# Patient Record
Sex: FEMALE | Race: BLACK OR AFRICAN AMERICAN | Employment: FULL TIME | URBAN - METROPOLITAN AREA
[De-identification: names, ages, dates, MRNs, and addresses within clinical notes are randomized per-mention and may not be internally consistent; named-entity substitution may affect disease eponyms.]

---

## 2019-02-20 ENCOUNTER — APPOINTMENT (OUTPATIENT)
Dept: GENERAL RADIOLOGY | Facility: HOSPITAL | Age: 39
End: 2019-02-20
Payer: COMMERCIAL

## 2019-02-20 ENCOUNTER — HOSPITAL ENCOUNTER (EMERGENCY)
Facility: HOSPITAL | Age: 39
Discharge: HOME | End: 2019-02-20
Payer: COMMERCIAL

## 2019-02-20 VITALS
HEIGHT: 66 IN | WEIGHT: 290 LBS | BODY MASS INDEX: 46.61 KG/M2 | DIASTOLIC BLOOD PRESSURE: 102 MMHG | SYSTOLIC BLOOD PRESSURE: 135 MMHG | RESPIRATION RATE: 18 BRPM | HEART RATE: 74 BPM | TEMPERATURE: 98 F | OXYGEN SATURATION: 100 %

## 2019-02-20 DIAGNOSIS — S86.911A KNEE STRAIN, RIGHT, INITIAL ENCOUNTER: Primary | ICD-10-CM

## 2019-02-20 PROCEDURE — 99283 EMERGENCY DEPT VISIT LOW MDM: CPT

## 2019-02-20 PROCEDURE — 73560 X-RAY EXAM OF KNEE 1 OR 2: CPT

## 2019-02-20 NOTE — ED PROVIDER NOTES
Patient Seen in: [facility] AND [facility] Emergency Department    History   Patient presents with:  Knee Pain    Stated Complaint: right knee pain, history of knee sx 7 years ago    HPI    The patient is a 51-year-old female who presents with 3 days of atraumat Musculoskeletal:        Right knee: She exhibits swelling (Mild). She exhibits normal range of motion, no effusion, no deformity, no LCL laxity and no MCL laxity. Tenderness found. Medial joint line and lateral joint line tenderness noted.    No calf tend

## 2019-02-20 NOTE — ED INITIAL ASSESSMENT (HPI)
Atraumatic right knee pain since Sunday.  Patient reports meniscus and ACL repair 7 years ago    Ambulatory in triage, but with pain  Taking tylenol /iburprofen

## 2020-04-23 ENCOUNTER — TELEMEDICINE (OUTPATIENT)
Dept: SURGERY | Facility: CLINIC | Age: 40
End: 2020-04-23

## 2020-04-23 VITALS — BODY MASS INDEX: 44.42 KG/M2 | WEIGHT: 283 LBS | HEIGHT: 67 IN

## 2020-04-23 DIAGNOSIS — Z86.2 HISTORY OF ANEMIA: Primary | ICD-10-CM

## 2020-04-23 DIAGNOSIS — E66.01 MORBID OBESITY WITH BMI OF 45.0-49.9, ADULT (HCC): ICD-10-CM

## 2020-04-23 DIAGNOSIS — R63.5 WEIGHT GAIN: ICD-10-CM

## 2020-04-23 DIAGNOSIS — Z51.81 ENCOUNTER FOR THERAPEUTIC DRUG MONITORING: ICD-10-CM

## 2020-04-23 PROCEDURE — 99204 OFFICE O/P NEW MOD 45 MIN: CPT | Performed by: NURSE PRACTITIONER

## 2020-04-23 RX ORDER — TOPIRAMATE 25 MG/1
25 TABLET ORAL DAILY
Qty: 30 TABLET | Refills: 1 | Status: SHIPPED | OUTPATIENT
Start: 2020-04-23

## 2020-04-23 RX ORDER — PHENTERMINE HYDROCHLORIDE 15 MG/1
15 CAPSULE ORAL EVERY MORNING
Qty: 30 CAPSULE | Refills: 1 | Status: SHIPPED | OUTPATIENT
Start: 2020-04-23

## 2020-04-23 NOTE — PROGRESS NOTES
Virtual Video/Telephone Check-In    Timmy Miller verbally consents to a [employer] visit on 04/23/20.     Patient understands and accepts financial responsibility for any deductible, co-insurance and/or co-pays associated with this kg)  01/22/15 : 277 lb (125.6 kg)  12/29/14 : 280 lb (127 kg)  11/25/14 : 285 lb (129.3 kg)  11/18/14 : 285 lb (129.3 kg)       Patient Medications:    Current Outpatient Medications   Medication Sig Dispense Refill   • Hydroxyprogesterone Caproate 250 MG/ Concerns:        Not on file    Social History Narrative      Not on file    Surgical History:    Past Surgical History:   Procedure Laterality Date   [facility]     • OTHER SURGICAL HISTORY      knee surgery       Family History:  History revi days  Musculoskeletal:negative  Neurological: negative  Behavioral/Psych: negative  Endocrine: negative   Regular menses, not currently sexually active    Physical Exam:  General: alert, oriented x 3, cooperative, speaking in full sentences, appears stated beverages per day. No fruit juices or regular soda. 3. Aim for 150 minutes moderate exercise per week. 4. Increase fruit and vegetable servings to 5-6 per day. 5. Improve sleep and stress. Reviewed labs, plan to update at this time.     Discuss

## 2020-04-23 NOTE — PATIENT INSTRUCTIONS
Values: Feeling good, looking good in clothes, Self, Prevent health issues     Bring in plain broth overnight at work. Try to eat breakfast at work before coming home to sleep.   Eggs with veggies + fruit or Oatmeal with flaxseed ground, add in berries, ad day.    Aim for 3-4 servings of healthy fats: olives, fatty fish, olive oil, seeds, nuts, avocado, coconut oil. Attend bariatric seminar. To schedule bariatric seminar:   Call [phone] or   Schedule Online at- [email or web address]

## 2020-09-07 ENCOUNTER — HOSPITAL ENCOUNTER (OUTPATIENT)
Age: 40
Discharge: HOME | End: 2020-09-07
Payer: COMMERCIAL

## 2020-09-07 VITALS
HEART RATE: 83 BPM | WEIGHT: 290 LBS | HEIGHT: 67 IN | DIASTOLIC BLOOD PRESSURE: 87 MMHG | BODY MASS INDEX: 45.52 KG/M2 | OXYGEN SATURATION: 99 % | RESPIRATION RATE: 18 BRPM | SYSTOLIC BLOOD PRESSURE: 125 MMHG | TEMPERATURE: 98 F

## 2020-09-07 DIAGNOSIS — Z20.822 EXPOSURE TO COVID-19 VIRUS: Primary | ICD-10-CM

## 2020-09-07 PROCEDURE — 99212 OFFICE O/P EST SF 10 MIN: CPT | Performed by: NURSE PRACTITIONER

## 2020-09-07 PROCEDURE — U0003 INFECTIOUS AGENT DETECTION BY NUCLEIC ACID (DNA OR RNA); SEVERE ACUTE RESPIRATORY SYNDROME CORONAVIRUS 2 (SARS-COV-2) (CORONAVIRUS DISEASE [COVID-19]), AMPLIFIED PROBE TECHNIQUE, MAKING USE OF HIGH THROUGHPUT TECHNOLOGIES AS DESCRIBED BY CMS-2020-01-R: HCPCS | Performed by: NURSE PRACTITIONER

## 2020-09-07 NOTE — ED INITIAL ASSESSMENT (HPI)
Pt comes in today requesting COVID testing due to a recent exposure. Denies any symptoms @ this time of covid.

## 2020-09-07 NOTE — ED PROVIDER NOTES
Patient Seen in: [address]      History   Patient presents with:  Lab: covid    Stated Complaint: COVID TESTING    HPI    This is a well-appearing 51-year-old female who presents with a chief complaint of covert test. normal.      Extraocular Movements: Extraocular movements intact. Conjunctiva/sclera: Conjunctivae normal.      Pupils: Pupils are equal, round, and reactive to light. Cardiovascular:      Rate and Rhythm: Normal rate and regular rhythm.       Pulses

## 2020-09-09 LAB — SARS-COV-2 BY PCR: NOT DETECTED

## 2021-02-21 ENCOUNTER — APPOINTMENT (OUTPATIENT)
Dept: CT IMAGING | Facility: HOSPITAL | Age: 41
End: 2021-02-21
Attending: EMERGENCY MEDICINE
Payer: COMMERCIAL

## 2021-02-21 ENCOUNTER — HOSPITAL ENCOUNTER (EMERGENCY)
Facility: HOSPITAL | Age: 41
Discharge: HOME | End: 2021-02-21
Attending: EMERGENCY MEDICINE
Payer: COMMERCIAL

## 2021-02-21 VITALS
HEART RATE: 78 BPM | SYSTOLIC BLOOD PRESSURE: 137 MMHG | DIASTOLIC BLOOD PRESSURE: 74 MMHG | RESPIRATION RATE: 18 BRPM | TEMPERATURE: 98 F | OXYGEN SATURATION: 97 %

## 2021-02-21 DIAGNOSIS — M54.2 NECK PAIN: ICD-10-CM

## 2021-02-21 DIAGNOSIS — G44.209 TENSION HEADACHE: Primary | ICD-10-CM

## 2021-02-21 PROCEDURE — 99284 EMERGENCY DEPT VISIT MOD MDM: CPT

## 2021-02-21 PROCEDURE — 70450 CT HEAD/BRAIN W/O DYE: CPT | Performed by: EMERGENCY MEDICINE

## 2021-02-21 NOTE — ED INITIAL ASSESSMENT (HPI)
Patient aox3 to ed via private vehicle patient co of right sided headache with occular and neck pain x1 week.

## 2021-02-21 NOTE — ED PROVIDER NOTES
Patient Seen in: [facility] Emergency Department      History   Patient presents with:  Headache    Stated Complaint: headache/neck pain    HPI/Subjective:   HPI    The patient is a 81-year-old female who presents with weeks of right-sided neck pa Mouth/Throat:      Mouth: Mucous membranes are moist.   Eyes:      Extraocular Movements: Extraocular movements intact. Right eye: No nystagmus.       Conjunctiva/sclera: Conjunctivae normal.      Pupils: Pupils are equal, round, and reactive to lig CONCLUSION:  No acute intracranial process by noncontrast CT technique.     Dictated by (CST): Germán Antony MD on 2/21/2021 at 12:10 PM     Finalized by (CST): Germán Antony MD on 2/21/2021 at 12:11 PM            Radiology exams  Viewed and reviewed by

## 2021-05-12 ENCOUNTER — OFFICE VISIT (OUTPATIENT)
Dept: OTOLARYNGOLOGY | Facility: CLINIC | Age: 41
End: 2021-05-12
Payer: COMMERCIAL

## 2021-05-12 VITALS
BODY MASS INDEX: 45.52 KG/M2 | WEIGHT: 290 LBS | DIASTOLIC BLOOD PRESSURE: 80 MMHG | TEMPERATURE: 99 F | SYSTOLIC BLOOD PRESSURE: 121 MMHG | HEIGHT: 67 IN

## 2021-05-12 DIAGNOSIS — R09.81 CHRONIC NASAL CONGESTION: ICD-10-CM

## 2021-05-12 DIAGNOSIS — K21.9 LARYNGOPHARYNGEAL REFLUX (LPR): Primary | ICD-10-CM

## 2021-05-12 DIAGNOSIS — R06.83 SNORING: ICD-10-CM

## 2021-05-12 PROCEDURE — 31575 DIAGNOSTIC LARYNGOSCOPY: CPT | Performed by: OTOLARYNGOLOGY

## 2021-05-12 PROCEDURE — 3008F BODY MASS INDEX DOCD: CPT | Performed by: OTOLARYNGOLOGY

## 2021-05-12 PROCEDURE — 3074F SYST BP LT 130 MM HG: CPT | Performed by: OTOLARYNGOLOGY

## 2021-05-12 PROCEDURE — 3079F DIAST BP 80-89 MM HG: CPT | Performed by: OTOLARYNGOLOGY

## 2021-05-12 PROCEDURE — 99243 OFF/OP CNSLTJ NEW/EST LOW 30: CPT | Performed by: OTOLARYNGOLOGY

## 2021-05-12 RX ORDER — OMEPRAZOLE 40 MG/1
40 CAPSULE, DELAYED RELEASE ORAL DAILY
Qty: 30 CAPSULE | Refills: 11 | Status: SHIPPED | OUTPATIENT
Start: 2021-05-12

## 2021-05-12 RX ORDER — FEXOFENADINE HCL AND PSEUDOEPHEDRINE HCI 180; 240 MG/1; MG/1
1 TABLET, EXTENDED RELEASE ORAL DAILY
Qty: 30 TABLET | Refills: 3 | Status: SHIPPED
Start: 2021-05-12

## 2021-05-12 NOTE — PROGRESS NOTES
Danny Olson is a 47year old female. Patient presents with:  Throat Problem: pt presents today with dry mouth and drainage , also is painfull when when swallowing or drinking.       HISTORY OF PRESENT ILLNESS  5/12/2021  Patient presents for evaluation diarrhea. Endocrine Negative Cold intolerance and heat intolerance. Neuro Negative Tremors. Psych Negative Anxiety and depression. Integumentary Negative Frequent skin infections, pigment change and rash.    Hema/Lymph Negative Easy bleeding and eas the bilateral nasal cavities. The flexible fiberoptic laryngoscope was threaded into the left nasal cavity and threaded into the nasopharynx. The septum was noted to be deviated slightly .  The turbinates were non enlarged and No intranasal polyps were note the presenting complaint. I recommend a minimum of 6 weeks of a proton pump inhibitor and then a reassessment.  Should the  symptoms not be improved by this regimen I would recommend a formal gastroenterology evaluation.  - Cris Peace

## 2021-05-13 RX ORDER — FLUTICASONE PROPIONATE 50 MCG
SPRAY, SUSPENSION (ML) NASAL
Qty: 16 G | Refills: 0 | Status: SHIPPED | OUTPATIENT
Start: 2021-05-13

## 2021-06-02 ENCOUNTER — OFFICE VISIT (OUTPATIENT)
Dept: OTOLARYNGOLOGY | Facility: CLINIC | Age: 41
End: 2021-06-02
Payer: COMMERCIAL

## 2021-06-02 VITALS
DIASTOLIC BLOOD PRESSURE: 89 MMHG | TEMPERATURE: 100 F | WEIGHT: 290 LBS | SYSTOLIC BLOOD PRESSURE: 138 MMHG | BODY MASS INDEX: 45.52 KG/M2 | HEIGHT: 67 IN

## 2021-06-02 DIAGNOSIS — R13.14 PHARYNGOESOPHAGEAL DYSPHAGIA: ICD-10-CM

## 2021-06-02 DIAGNOSIS — K21.9 LARYNGOPHARYNGEAL REFLUX (LPR): Primary | ICD-10-CM

## 2021-06-02 DIAGNOSIS — R09.81 CHRONIC NASAL CONGESTION: ICD-10-CM

## 2021-06-02 DIAGNOSIS — G47.33 OSA (OBSTRUCTIVE SLEEP APNEA): ICD-10-CM

## 2021-06-02 PROCEDURE — 3075F SYST BP GE 130 - 139MM HG: CPT | Performed by: OTOLARYNGOLOGY

## 2021-06-02 PROCEDURE — 3008F BODY MASS INDEX DOCD: CPT | Performed by: OTOLARYNGOLOGY

## 2021-06-02 PROCEDURE — 3079F DIAST BP 80-89 MM HG: CPT | Performed by: OTOLARYNGOLOGY

## 2021-06-02 PROCEDURE — 99213 OFFICE O/P EST LOW 20 MIN: CPT | Performed by: OTOLARYNGOLOGY

## 2021-06-02 RX ORDER — FEXOFENADINE HCL AND PSEUDOEPHEDRINE HCI 180; 240 MG/1; MG/1
1 TABLET, EXTENDED RELEASE ORAL DAILY
Qty: 30 TABLET | Refills: 11 | Status: SHIPPED
Start: 2021-06-02

## 2021-06-02 NOTE — PROGRESS NOTES
Cash Person is a 47year old female. Patient presents with:   Follow - Up: 6 week follow up- laryngopharyngeal reflux- per pt no changes/improvement of symptoms      HISTORY OF PRESENT ILLNESS  5/12/2021  Patient presents for evaluation of concerns ove file.    Past Surgical History:   Procedure Laterality Date   • [facility] CERVICAL CERCLAGE     • OTHER SURGICAL HISTORY      knee surgery         REVIEW OF SYSTEMS    System Neg/Pos Details   Constitutional Negative Fatigue, fever and weight loss.    ENMT Negativ Normal.              Current Outpatient Medications:   •  Fexofenadine-Pseudoephed -240 MG Oral Tablet 24 Hr, Take 1 tablet by mouth daily. , Disp: 30 tablet, Rfl: 11  •  FLUTICASONE PROPIONATE 50 MCG/ACT Nasal Suspension, SHAKE LIQUID AND USE 1 SPRAY so I did did call this and again asked her to try to see if this improves her breathing    This note was partially prepared using [employer] voice recognition dictation software. As a result, errors may occur.  When identified, these errors have been co

## 2021-06-05 ENCOUNTER — LAB ENCOUNTER (OUTPATIENT)
Dept: LAB | Facility: HOSPITAL | Age: 41
End: 2021-06-05
Attending: OTOLARYNGOLOGY
Payer: COMMERCIAL

## 2021-06-05 DIAGNOSIS — R13.14 PHARYNGOESOPHAGEAL DYSPHAGIA: ICD-10-CM

## 2021-06-08 ENCOUNTER — HOSPITAL ENCOUNTER (OUTPATIENT)
Dept: GENERAL RADIOLOGY | Facility: HOSPITAL | Age: 41
Discharge: HOME | End: 2021-06-08
Attending: OTOLARYNGOLOGY
Payer: COMMERCIAL

## 2021-06-08 DIAGNOSIS — R13.14 PHARYNGOESOPHAGEAL DYSPHAGIA: ICD-10-CM

## 2021-06-08 PROCEDURE — 74220 X-RAY XM ESOPHAGUS 1CNTRST: CPT | Performed by: OTOLARYNGOLOGY

## 2021-06-24 ENCOUNTER — OFFICE VISIT (OUTPATIENT)
Dept: OTOLARYNGOLOGY | Facility: CLINIC | Age: 41
End: 2021-06-24
Payer: COMMERCIAL

## 2021-06-24 VITALS
TEMPERATURE: 98 F | BODY MASS INDEX: 45.52 KG/M2 | SYSTOLIC BLOOD PRESSURE: 146 MMHG | WEIGHT: 290 LBS | HEIGHT: 67 IN | DIASTOLIC BLOOD PRESSURE: 94 MMHG

## 2021-06-24 DIAGNOSIS — K21.9 LARYNGOPHARYNGEAL REFLUX (LPR): ICD-10-CM

## 2021-06-24 DIAGNOSIS — G47.33 OSA (OBSTRUCTIVE SLEEP APNEA): Primary | ICD-10-CM

## 2021-06-24 DIAGNOSIS — R09.81 CHRONIC NASAL CONGESTION: ICD-10-CM

## 2021-06-24 PROCEDURE — 3080F DIAST BP >= 90 MM HG: CPT | Performed by: OTOLARYNGOLOGY

## 2021-06-24 PROCEDURE — 3008F BODY MASS INDEX DOCD: CPT | Performed by: OTOLARYNGOLOGY

## 2021-06-24 PROCEDURE — 99214 OFFICE O/P EST MOD 30 MIN: CPT | Performed by: OTOLARYNGOLOGY

## 2021-06-24 PROCEDURE — 3077F SYST BP >= 140 MM HG: CPT | Performed by: OTOLARYNGOLOGY

## 2021-06-24 NOTE — PROGRESS NOTES
Radha Griffiths is a 47year old female. Patient presents with:   Follow - Up: regarding dysphagia, slight improvement in symptoms, pt states sleep study is scheduled in July       HISTORY OF PRESENT ILLNESS  5/12/2021  Patient presents for evaluation of c History      Marital status: Single      Spouse name: Not on file      Number of children: Not on file      Years of education: Not on file      Highest education level: Not on file    Tobacco Use      Smoking status: Never Smoker      Smokeless tobacco: N XII grossly intact.    Head/Face Normal Facial features - Normal. Eyebrows - Normal. Skull - Normal.             Ears Normal Inspection - Right: Normal, Left: Normal. Canal - Right: Normal, Left: Normal. TM - Right: Normal, Left: Normal.   Skin Normal Inspe might be related to the ongoing reflux or also just mouth breathing and sleep apnea continue omeprazole  2. Rule out sleep apnea  Scheduled for July 3 I will call her with the results  3.  Chronic nasal congestion  Doing great on Allegra-D continue as neede

## 2021-06-24 NOTE — PROGRESS NOTES
Esophagram was reviewed and is essentially normal    ASSESSMENT AND PLAN  1.  Laryngal pharyngeal reflux  Greatly improved a little bit of residual symptoms might be related to the ongoing reflux or also just mouth breathing and sleep apnea continue omepra

## 2021-10-21 ENCOUNTER — HOSPITAL ENCOUNTER (EMERGENCY)
Facility: HOSPITAL | Age: 41
Discharge: HOME | End: 2021-10-21
Attending: EMERGENCY MEDICINE
Payer: COMMERCIAL

## 2021-10-21 ENCOUNTER — APPOINTMENT (OUTPATIENT)
Dept: GENERAL RADIOLOGY | Facility: HOSPITAL | Age: 41
End: 2021-10-21
Payer: COMMERCIAL

## 2021-10-21 VITALS
RESPIRATION RATE: 18 BRPM | DIASTOLIC BLOOD PRESSURE: 89 MMHG | WEIGHT: 290 LBS | BODY MASS INDEX: 45 KG/M2 | SYSTOLIC BLOOD PRESSURE: 133 MMHG | HEART RATE: 92 BPM | OXYGEN SATURATION: 100 % | TEMPERATURE: 98 F

## 2021-10-21 DIAGNOSIS — M25.461 EFFUSION OF RIGHT KNEE: ICD-10-CM

## 2021-10-21 DIAGNOSIS — M17.10 ARTHRITIS OF KNEE: Primary | ICD-10-CM

## 2021-10-21 PROCEDURE — 73560 X-RAY EXAM OF KNEE 1 OR 2: CPT

## 2021-10-21 PROCEDURE — 81025 URINE PREGNANCY TEST: CPT

## 2021-10-21 PROCEDURE — 99283 EMERGENCY DEPT VISIT LOW MDM: CPT

## 2021-10-21 PROCEDURE — 96372 THER/PROPH/DIAG INJ SC/IM: CPT

## 2021-10-21 RX ORDER — TRAMADOL HYDROCHLORIDE 50 MG/1
50 TABLET ORAL EVERY 6 HOURS PRN
Qty: 10 TABLET | Refills: 0 | Status: SHIPPED | OUTPATIENT
Start: 2021-10-21 | End: 2021-10-28

## 2021-10-21 RX ORDER — KETOROLAC TROMETHAMINE 30 MG/ML
30 INJECTION, SOLUTION INTRAMUSCULAR; INTRAVENOUS ONCE
Status: COMPLETED | OUTPATIENT
Start: 2021-10-21 | End: 2021-10-21

## 2021-10-21 NOTE — ED PROVIDER NOTES
Patient Seen in: [facility] AND [facility] Emergency Department      History   Patient presents with:  Knee Pain    Stated Complaint: R knee pain x3 weeks     Subjective:   HPI    65-year-old female with no significant past medical history here with complaints o Temp src --    SpO2 10/21/21 0858 100 %   O2 Device 10/21/21 1015 None (Room air)       Current:/89   Pulse 92   Temp 98 °F (36.7 °C)   Resp 18   Wt 131.5 kg   LMP 02/12/2021   SpO2 100%   BMI 45.42 kg/m²         Physical Exam  Vitals and nursing n evaluation.      41yoF with knee pain  - I personally reviewed and interpreted all the ED vitals  - afebrile, hemodynamically stable  - I ordered and personally reviewed the labs and imaging and found negative pregnancy test, XR without fracture, + arthriti

## 2021-10-21 NOTE — ED QUICK NOTES
Pt denies any new trauma, states her right knee has been increasing in the amount of pain over the last 3 weeks worse when walking on it. States she had a meniscus surgery 10 years ago to the same knee.

## 2022-12-14 ENCOUNTER — HOSPITAL ENCOUNTER (EMERGENCY)
Facility: HOSPITAL | Age: 42
Discharge: HOME | End: 2022-12-14
Payer: COMMERCIAL

## 2022-12-14 ENCOUNTER — APPOINTMENT (OUTPATIENT)
Dept: CT IMAGING | Facility: HOSPITAL | Age: 42
End: 2022-12-14
Attending: NURSE PRACTITIONER
Payer: COMMERCIAL

## 2022-12-14 VITALS
DIASTOLIC BLOOD PRESSURE: 89 MMHG | HEART RATE: 70 BPM | OXYGEN SATURATION: 99 % | RESPIRATION RATE: 20 BRPM | TEMPERATURE: 98 F | SYSTOLIC BLOOD PRESSURE: 132 MMHG

## 2022-12-14 DIAGNOSIS — N20.1 URETERAL STONE: Primary | ICD-10-CM

## 2022-12-14 LAB
ANION GAP SERPL CALC-SCNC: 5 MMOL/L (ref 0–18)
B-HCG UR QL: NEGATIVE
BASOPHILS # BLD AUTO: 0.04 X10(3) UL (ref 0–0.2)
BASOPHILS NFR BLD AUTO: 0.3 %
BILIRUB UR QL: NEGATIVE
BUN BLD-MCNC: 11 MG/DL (ref 7–18)
BUN/CREAT SERPL: 16.9 (ref 10–20)
CALCIUM BLD-MCNC: 9.1 MG/DL (ref 8.5–10.1)
CHLORIDE SERPL-SCNC: 107 MMOL/L (ref 98–112)
CK SERPL-CCNC: 75 U/L
CLARITY UR: CLEAR
CO2 SERPL-SCNC: 27 MMOL/L (ref 21–32)
COLOR UR: YELLOW
CREAT BLD-MCNC: 0.65 MG/DL
DEPRECATED RDW RBC AUTO: 40.6 FL (ref 35.1–46.3)
EOSINOPHIL # BLD AUTO: 0.23 X10(3) UL (ref 0–0.7)
EOSINOPHIL NFR BLD AUTO: 1.8 %
ERYTHROCYTE [DISTWIDTH] IN BLOOD BY AUTOMATED COUNT: 14.9 % (ref 11–15)
GFR SERPLBLD BASED ON 1.73 SQ M-ARVRAT: 114 ML/MIN/1.73M2 (ref 60–?)
GLUCOSE BLD-MCNC: 97 MG/DL (ref 70–99)
GLUCOSE UR-MCNC: NEGATIVE MG/DL
HCT VFR BLD AUTO: 40.9 %
HGB BLD-MCNC: 12.2 G/DL
IMM GRANULOCYTES # BLD AUTO: 0.02 X10(3) UL (ref 0–1)
IMM GRANULOCYTES NFR BLD: 0.2 %
KETONES UR-MCNC: NEGATIVE MG/DL
LEUKOCYTE ESTERASE UR QL STRIP.AUTO: NEGATIVE
LYMPHOCYTES # BLD AUTO: 8.71 X10(3) UL (ref 1–4)
LYMPHOCYTES NFR BLD AUTO: 66.9 %
MCH RBC QN AUTO: 22.3 PG (ref 26–34)
MCHC RBC AUTO-ENTMCNC: 29.8 G/DL (ref 31–37)
MCV RBC AUTO: 74.8 FL
MONOCYTES # BLD AUTO: 0.58 X10(3) UL (ref 0.1–1)
MONOCYTES NFR BLD AUTO: 4.5 %
NEUTROPHILS # BLD AUTO: 3.44 X10 (3) UL (ref 1.5–7.7)
NEUTROPHILS # BLD AUTO: 3.44 X10(3) UL (ref 1.5–7.7)
NEUTROPHILS NFR BLD AUTO: 26.3 %
NITRITE UR QL STRIP.AUTO: NEGATIVE
OSMOLALITY SERPL CALC.SUM OF ELEC: 287 MOSM/KG (ref 275–295)
PH UR: 7 [PH] (ref 5–8)
PLATELET # BLD AUTO: 425 10(3)UL (ref 150–450)
POTASSIUM SERPL-SCNC: 3.9 MMOL/L (ref 3.5–5.1)
PROT UR-MCNC: NEGATIVE MG/DL
RBC # BLD AUTO: 5.47 X10(6)UL
SODIUM SERPL-SCNC: 139 MMOL/L (ref 136–145)
SP GR UR STRIP: 1.01 (ref 1–1.03)
UROBILINOGEN UR STRIP-ACNC: <2
VIT C UR-MCNC: NEGATIVE MG/DL
WBC # BLD AUTO: 13 X10(3) UL (ref 4–11)

## 2022-12-14 PROCEDURE — 85060 BLOOD SMEAR INTERPRETATION: CPT

## 2022-12-14 PROCEDURE — 85025 COMPLETE CBC W/AUTO DIFF WBC: CPT

## 2022-12-14 PROCEDURE — 99284 EMERGENCY DEPT VISIT MOD MDM: CPT

## 2022-12-14 PROCEDURE — 74176 CT ABD & PELVIS W/O CONTRAST: CPT | Performed by: NURSE PRACTITIONER

## 2022-12-14 PROCEDURE — 82550 ASSAY OF CK (CPK): CPT | Performed by: NURSE PRACTITIONER

## 2022-12-14 PROCEDURE — 80048 BASIC METABOLIC PNL TOTAL CA: CPT

## 2022-12-14 PROCEDURE — 96361 HYDRATE IV INFUSION ADD-ON: CPT

## 2022-12-14 PROCEDURE — 96374 THER/PROPH/DIAG INJ IV PUSH: CPT

## 2022-12-14 PROCEDURE — 81025 URINE PREGNANCY TEST: CPT

## 2022-12-14 PROCEDURE — 81001 URINALYSIS AUTO W/SCOPE: CPT

## 2022-12-14 RX ORDER — KETOROLAC TROMETHAMINE 15 MG/ML
15 INJECTION, SOLUTION INTRAMUSCULAR; INTRAVENOUS ONCE
Status: COMPLETED | OUTPATIENT
Start: 2022-12-14 | End: 2022-12-14

## 2022-12-14 RX ORDER — TAMSULOSIN HYDROCHLORIDE 0.4 MG/1
0.4 CAPSULE ORAL ONCE
Status: COMPLETED | OUTPATIENT
Start: 2022-12-14 | End: 2022-12-14

## 2022-12-14 RX ORDER — HYDROCODONE BITARTRATE AND ACETAMINOPHEN 5; 325 MG/1; MG/1
1-2 TABLET ORAL EVERY 6 HOURS PRN
Qty: 10 TABLET | Refills: 0 | Status: SHIPPED | OUTPATIENT
Start: 2022-12-14 | End: 2022-12-19

## 2022-12-14 RX ORDER — HYDROCODONE BITARTRATE AND ACETAMINOPHEN 5; 325 MG/1; MG/1
1 TABLET ORAL ONCE
Status: COMPLETED | OUTPATIENT
Start: 2022-12-14 | End: 2022-12-14

## 2022-12-14 RX ORDER — TAMSULOSIN HYDROCHLORIDE 0.4 MG/1
0.4 CAPSULE ORAL DAILY
Qty: 7 CAPSULE | Refills: 0 | Status: SHIPPED | OUTPATIENT
Start: 2022-12-14 | End: 2022-12-21

## 2023-01-16 ENCOUNTER — LAB ENCOUNTER (OUTPATIENT)
Dept: LAB | Facility: HOSPITAL | Age: 43
End: 2023-01-16
Attending: UROLOGY
Payer: COMMERCIAL

## 2023-01-16 DIAGNOSIS — Z01.818 PREOPERATIVE TESTING: ICD-10-CM

## 2023-01-16 LAB — SARS-COV-2 RNA RESP QL NAA+PROBE: NOT DETECTED

## 2023-01-17 RX ORDER — IBUPROFEN 200 MG
200 TABLET ORAL EVERY 6 HOURS PRN
COMMUNITY

## 2023-01-19 ENCOUNTER — APPOINTMENT (OUTPATIENT)
Dept: GENERAL RADIOLOGY | Facility: HOSPITAL | Age: 43
End: 2023-01-19
Attending: UROLOGY
Payer: COMMERCIAL

## 2023-01-19 ENCOUNTER — ANESTHESIA EVENT (OUTPATIENT)
Dept: SURGERY | Facility: HOSPITAL | Age: 43
End: 2023-01-19
Payer: COMMERCIAL

## 2023-01-19 ENCOUNTER — ANESTHESIA (OUTPATIENT)
Dept: SURGERY | Facility: HOSPITAL | Age: 43
End: 2023-01-19
Payer: COMMERCIAL

## 2023-01-19 ENCOUNTER — HOSPITAL ENCOUNTER (OUTPATIENT)
Facility: HOSPITAL | Age: 43
Setting detail: HOSPITAL OUTPATIENT SURGERY
Discharge: HOME | End: 2023-01-19
Attending: UROLOGY | Admitting: UROLOGY
Payer: COMMERCIAL

## 2023-01-19 VITALS
SYSTOLIC BLOOD PRESSURE: 130 MMHG | DIASTOLIC BLOOD PRESSURE: 80 MMHG | RESPIRATION RATE: 20 BRPM | OXYGEN SATURATION: 99 % | BODY MASS INDEX: 45.99 KG/M2 | WEIGHT: 293 LBS | HEART RATE: 73 BPM | HEIGHT: 67 IN | TEMPERATURE: 98 F

## 2023-01-19 DIAGNOSIS — Z01.818 PREOPERATIVE TESTING: Primary | ICD-10-CM

## 2023-01-19 LAB — B-HCG UR QL: NEGATIVE

## 2023-01-19 PROCEDURE — 0TC78ZZ EXTIRPATION OF MATTER FROM LEFT URETER, VIA NATURAL OR ARTIFICIAL OPENING ENDOSCOPIC: ICD-10-PCS | Performed by: UROLOGY

## 2023-01-19 PROCEDURE — 82365 CALCULUS SPECTROSCOPY: CPT | Performed by: UROLOGY

## 2023-01-19 PROCEDURE — 88300 SURGICAL PATH GROSS: CPT | Performed by: UROLOGY

## 2023-01-19 PROCEDURE — 0T778DZ DILATION OF LEFT URETER WITH INTRALUMINAL DEVICE, VIA NATURAL OR ARTIFICIAL OPENING ENDOSCOPIC: ICD-10-PCS | Performed by: UROLOGY

## 2023-01-19 PROCEDURE — 0TC18ZZ EXTIRPATION OF MATTER FROM LEFT KIDNEY, VIA NATURAL OR ARTIFICIAL OPENING ENDOSCOPIC: ICD-10-PCS | Performed by: UROLOGY

## 2023-01-19 PROCEDURE — 81025 URINE PREGNANCY TEST: CPT

## 2023-01-19 DEVICE — URETERAL STENT
Type: IMPLANTABLE DEVICE | Site: URETER | Status: FUNCTIONAL
Brand: ASCERTA™

## 2023-01-19 RX ORDER — FAMOTIDINE 20 MG/1
20 TABLET, FILM COATED ORAL ONCE
Status: COMPLETED | OUTPATIENT
Start: 2023-01-19 | End: 2023-01-19

## 2023-01-19 RX ORDER — MORPHINE SULFATE 4 MG/ML
2 INJECTION, SOLUTION INTRAMUSCULAR; INTRAVENOUS EVERY 10 MIN PRN
Status: DISCONTINUED | OUTPATIENT
Start: 2023-01-19 | End: 2023-01-19

## 2023-01-19 RX ORDER — HYDROMORPHONE HYDROCHLORIDE 1 MG/ML
0.4 INJECTION, SOLUTION INTRAMUSCULAR; INTRAVENOUS; SUBCUTANEOUS EVERY 5 MIN PRN
Status: DISCONTINUED | OUTPATIENT
Start: 2023-01-19 | End: 2023-01-19

## 2023-01-19 RX ORDER — ROCURONIUM BROMIDE 10 MG/ML
INJECTION, SOLUTION INTRAVENOUS AS NEEDED
Status: DISCONTINUED | OUTPATIENT
Start: 2023-01-19 | End: 2023-01-19 | Stop reason: SURG

## 2023-01-19 RX ORDER — HYDROCODONE BITARTRATE AND ACETAMINOPHEN 5; 325 MG/1; MG/1
1 TABLET ORAL EVERY 6 HOURS PRN
Status: DISCONTINUED | OUTPATIENT
Start: 2023-01-19 | End: 2023-01-19

## 2023-01-19 RX ORDER — NALOXONE HYDROCHLORIDE 0.4 MG/ML
80 INJECTION, SOLUTION INTRAMUSCULAR; INTRAVENOUS; SUBCUTANEOUS AS NEEDED
Status: DISCONTINUED | OUTPATIENT
Start: 2023-01-19 | End: 2023-01-19

## 2023-01-19 RX ORDER — MORPHINE SULFATE 4 MG/ML
4 INJECTION, SOLUTION INTRAMUSCULAR; INTRAVENOUS EVERY 10 MIN PRN
Status: DISCONTINUED | OUTPATIENT
Start: 2023-01-19 | End: 2023-01-19

## 2023-01-19 RX ORDER — HYDROCODONE BITARTRATE AND ACETAMINOPHEN 5; 325 MG/1; MG/1
1 TABLET ORAL EVERY 6 HOURS PRN
Qty: 16 TABLET | Refills: 0 | Status: SHIPPED | OUTPATIENT
Start: 2023-01-19

## 2023-01-19 RX ORDER — LIDOCAINE HYDROCHLORIDE 10 MG/ML
INJECTION, SOLUTION EPIDURAL; INFILTRATION; INTRACAUDAL; PERINEURAL AS NEEDED
Status: DISCONTINUED | OUTPATIENT
Start: 2023-01-19 | End: 2023-01-19 | Stop reason: SURG

## 2023-01-19 RX ORDER — CEFAZOLIN SODIUM/WATER 2 G/20 ML
2 SYRINGE (ML) INTRAVENOUS
Status: COMPLETED | OUTPATIENT
Start: 2023-01-19 | End: 2023-01-19

## 2023-01-19 RX ORDER — METOCLOPRAMIDE 10 MG/1
10 TABLET ORAL ONCE
Status: COMPLETED | OUTPATIENT
Start: 2023-01-19 | End: 2023-01-19

## 2023-01-19 RX ORDER — ONDANSETRON 2 MG/ML
INJECTION INTRAMUSCULAR; INTRAVENOUS AS NEEDED
Status: DISCONTINUED | OUTPATIENT
Start: 2023-01-19 | End: 2023-01-19 | Stop reason: SURG

## 2023-01-19 RX ORDER — SODIUM CHLORIDE, SODIUM LACTATE, POTASSIUM CHLORIDE, CALCIUM CHLORIDE 600; 310; 30; 20 MG/100ML; MG/100ML; MG/100ML; MG/100ML
INJECTION, SOLUTION INTRAVENOUS CONTINUOUS
Status: DISCONTINUED | OUTPATIENT
Start: 2023-01-19 | End: 2023-01-19

## 2023-01-19 RX ORDER — MORPHINE SULFATE 10 MG/ML
6 INJECTION, SOLUTION INTRAMUSCULAR; INTRAVENOUS EVERY 10 MIN PRN
Status: DISCONTINUED | OUTPATIENT
Start: 2023-01-19 | End: 2023-01-19

## 2023-01-19 RX ORDER — GLYCOPYRROLATE 0.2 MG/ML
INJECTION, SOLUTION INTRAMUSCULAR; INTRAVENOUS AS NEEDED
Status: DISCONTINUED | OUTPATIENT
Start: 2023-01-19 | End: 2023-01-19 | Stop reason: SURG

## 2023-01-19 RX ORDER — LIDOCAINE HYDROCHLORIDE 40 MG/ML
SOLUTION TOPICAL AS NEEDED
Status: DISCONTINUED | OUTPATIENT
Start: 2023-01-19 | End: 2023-01-19 | Stop reason: SURG

## 2023-01-19 RX ORDER — PROCHLORPERAZINE EDISYLATE 5 MG/ML
5 INJECTION INTRAMUSCULAR; INTRAVENOUS EVERY 8 HOURS PRN
Status: DISCONTINUED | OUTPATIENT
Start: 2023-01-19 | End: 2023-01-19

## 2023-01-19 RX ORDER — HYDROMORPHONE HYDROCHLORIDE 1 MG/ML
0.6 INJECTION, SOLUTION INTRAMUSCULAR; INTRAVENOUS; SUBCUTANEOUS EVERY 5 MIN PRN
Status: DISCONTINUED | OUTPATIENT
Start: 2023-01-19 | End: 2023-01-19

## 2023-01-19 RX ORDER — HYDROMORPHONE HYDROCHLORIDE 1 MG/ML
0.2 INJECTION, SOLUTION INTRAMUSCULAR; INTRAVENOUS; SUBCUTANEOUS EVERY 5 MIN PRN
Status: DISCONTINUED | OUTPATIENT
Start: 2023-01-19 | End: 2023-01-19

## 2023-01-19 RX ORDER — HYDROCODONE BITARTRATE AND ACETAMINOPHEN 10; 325 MG/1; MG/1
2 TABLET ORAL ONCE AS NEEDED
Status: DISCONTINUED | OUTPATIENT
Start: 2023-01-19 | End: 2023-01-19

## 2023-01-19 RX ORDER — DEXAMETHASONE SODIUM PHOSPHATE 4 MG/ML
VIAL (ML) INJECTION AS NEEDED
Status: DISCONTINUED | OUTPATIENT
Start: 2023-01-19 | End: 2023-01-19 | Stop reason: SURG

## 2023-01-19 RX ORDER — ACETAMINOPHEN 500 MG
1000 TABLET ORAL ONCE
Status: COMPLETED | OUTPATIENT
Start: 2023-01-19 | End: 2023-01-19

## 2023-01-19 RX ORDER — ACETAMINOPHEN 500 MG
1000 TABLET ORAL ONCE AS NEEDED
Status: DISCONTINUED | OUTPATIENT
Start: 2023-01-19 | End: 2023-01-19

## 2023-01-19 RX ORDER — MIDAZOLAM HYDROCHLORIDE 1 MG/ML
INJECTION INTRAMUSCULAR; INTRAVENOUS AS NEEDED
Status: DISCONTINUED | OUTPATIENT
Start: 2023-01-19 | End: 2023-01-19 | Stop reason: SURG

## 2023-01-19 RX ORDER — ONDANSETRON 2 MG/ML
4 INJECTION INTRAMUSCULAR; INTRAVENOUS EVERY 6 HOURS PRN
Status: DISCONTINUED | OUTPATIENT
Start: 2023-01-19 | End: 2023-01-19

## 2023-01-19 RX ORDER — HYDROCODONE BITARTRATE AND ACETAMINOPHEN 10; 325 MG/1; MG/1
1 TABLET ORAL ONCE AS NEEDED
Status: DISCONTINUED | OUTPATIENT
Start: 2023-01-19 | End: 2023-01-19

## 2023-01-19 RX ADMIN — DEXAMETHASONE SODIUM PHOSPHATE 4 MG: 4 MG/ML VIAL (ML) INJECTION at 13:17:00

## 2023-01-19 RX ADMIN — GLYCOPYRROLATE 0.2 MG: 0.2 INJECTION, SOLUTION INTRAMUSCULAR; INTRAVENOUS at 13:17:00

## 2023-01-19 RX ADMIN — ROCURONIUM BROMIDE 20 MG: 10 INJECTION, SOLUTION INTRAVENOUS at 13:21:00

## 2023-01-19 RX ADMIN — SODIUM CHLORIDE, SODIUM LACTATE, POTASSIUM CHLORIDE, CALCIUM CHLORIDE: 600; 310; 30; 20 INJECTION, SOLUTION INTRAVENOUS at 14:04:00

## 2023-01-19 RX ADMIN — LIDOCAINE HYDROCHLORIDE 4 ML: 40 SOLUTION TOPICAL at 13:17:00

## 2023-01-19 RX ADMIN — MIDAZOLAM HYDROCHLORIDE 2 MG: 1 INJECTION INTRAMUSCULAR; INTRAVENOUS at 13:14:00

## 2023-01-19 RX ADMIN — ROCURONIUM BROMIDE 10 MG: 10 INJECTION, SOLUTION INTRAVENOUS at 13:17:00

## 2023-01-19 RX ADMIN — SODIUM CHLORIDE, SODIUM LACTATE, POTASSIUM CHLORIDE, CALCIUM CHLORIDE: 600; 310; 30; 20 INJECTION, SOLUTION INTRAVENOUS at 13:17:00

## 2023-01-19 RX ADMIN — LIDOCAINE HYDROCHLORIDE 50 MG: 10 INJECTION, SOLUTION EPIDURAL; INFILTRATION; INTRACAUDAL; PERINEURAL at 13:17:00

## 2023-01-19 RX ADMIN — CEFAZOLIN SODIUM/WATER 2 G: 2 G/20 ML SYRINGE (ML) INTRAVENOUS at 13:19:00

## 2023-01-19 RX ADMIN — ONDANSETRON 4 MG: 2 INJECTION INTRAMUSCULAR; INTRAVENOUS at 13:17:00

## 2023-01-19 NOTE — ANESTHESIA PROCEDURE NOTES
Airway  Date/Time: 1/19/2023 1:18 PM  Urgency: Elective      General Information and Staff    Patient location during procedure: OR  Anesthesiologist: Eileen Nunn MD  Performed: anesthesiologist     Indications and Patient Condition  Indications for airway management: anesthesia  Sedation level: deep  Preoxygenated: yes  Patient position: sniffing  Mask difficulty assessment: 2 - vent by mask + OA or adjuvant +/- NMBA    Final Airway Details  Final airway type: endotracheal airway      Successful airway: ETT  Cuffed: yes   Successful intubation technique: direct laryngoscopy  Endotracheal tube insertion site: oral  Blade: GlideScope  Blade size: #4  ETT size (mm): 7.0    Cormack-Lehane Classification: grade I - full view of glottis  Placement verified by: chest auscultation and capnometry   Cuff volume (mL): 7  Measured from: lips  ETT to lips (cm): 21  Number of attempts at approach: 1

## 2023-01-20 NOTE — OPERATIVE REPORT
[facility]    PATIENT'S NAME: Jaclyn Huang   ATTENDING PHYSICIAN: Ari Dang MD   OPERATING PHYSICIAN: Ari Dang MD   PATIENT ACCOUNT#:   [de-identified]    LOCATION:  [address]  MEDICAL RECORD #:   [id number]       YOB: 1980  ADMISSION DATE:       01/19/2023      OPERATION DATE:  01/19/2023    OPERATIVE REPORT      PREOPERATIVE DIAGNOSIS:  Left ureteral and renal stones. POSTOPERATIVE DIAGNOSIS:  Passed ureteral stone; left renal stone. PROCEDURE:  Cystoscopy, left retrograde pyelogram, left ureteroscopy, stone extraction, left ureteral stent placement. ANESTHESIA:  General endotracheal.    ESTIMATED BLOOD LOSS:  Minimal.    INTRAVENOUS FLUIDS:  See anesthesia records. URINE OUTPUT:  N/A.    COMPLICATIONS:  None. DRAINS:  A 6-Upper sorbian x 26 cm double-J ureteral stent. SPECIMENS:  Left renal stone. DISPOSITION:  Stable to recovery room. INDICATIONS:  The patient is a 53-year-old female who presented to me with left renal colic and had recent imaging showing an obstructing ureteral stone as well as a nonobstructing renal stone. She had persistent pain and did not see stone passage. Thus she elected for definitive management with ureteroscopy. Risks and possible complications were explained to the patient, who readily agreed to proceed. FINDINGS:  Passed ureteral stone, left renal stone. OPERATIVE TECHNIQUE:  Informed consent was obtained. The patient was brought to the operative suite where general endotracheal anesthesia was administered. She was placed in the dorsal lithotomy position, prepped and draped in the usual sterile fashion. A rigid cystoscope was easily advanced via the urethra to the bladder. The bladder was inspected, and no abnormalities were seen. A gentle retrograde pyelogram was performed on the left that did not show hydronephrosis or obstruction.   I then cannulated the ureteral orifice with a Sensor wire which advanced up to the kidney without difficulty. I dilated the distal ureter with a Delma dilator. I then advanced a flexible ureteroscope alongside the wire up to the kidney. In the course of this, I did not see any obstructing stones. A thorough nephroscopy was performed, and I did identify her single nonobstructing lower pole stone. This was grasped with a 1.9-Ugandan ZeroTip nitinol basket and brought out intact without trauma to the ureter. This was sent off for analysis. I then reintroduced the flexible ureteroscope and did a final nephroscopy, showing no additional stones or stone fragments. The ureter was cleared. I then, under fluoroscopic guidance, placed a 6-Ugandan x 26 cm double-J ureteral stent. Upon release of the wire, there was excellent curl in the kidney and the bladder. The bladder was drained. The patient was then placed in a supine position, extubated, awakened, and taken to the recovery room in good condition.     Dictated By Keaton Banks MD  d: 01/20/2023 11:19:58  t: 01/20/2023 16:30:49  Job [id number]  LB/

## 2023-01-23 LAB — CALCULI MASS: 8 MG

## 2023-02-14 ENCOUNTER — HOSPITAL ENCOUNTER (OUTPATIENT)
Dept: ULTRASOUND IMAGING | Facility: HOSPITAL | Age: 43
Discharge: HOME | End: 2023-02-14
Attending: UROLOGY
Payer: COMMERCIAL

## 2023-02-14 ENCOUNTER — LAB ENCOUNTER (OUTPATIENT)
Dept: LAB | Facility: HOSPITAL | Age: 43
End: 2023-02-14
Attending: UROLOGY
Payer: COMMERCIAL

## 2023-02-14 DIAGNOSIS — N20.0 KIDNEY STONE: Primary | ICD-10-CM

## 2023-02-14 DIAGNOSIS — N20.0 KIDNEY STONE: ICD-10-CM

## 2023-02-14 LAB
ALBUMIN SERPL-MCNC: 3.8 G/DL (ref 3.4–5)
ANION GAP SERPL CALC-SCNC: 9 MMOL/L (ref 0–18)
BUN BLD-MCNC: 14 MG/DL (ref 7–18)
BUN/CREAT SERPL: 19.4 (ref 10–20)
CALCIUM BLD-MCNC: 10 MG/DL (ref 8.5–10.1)
CHLORIDE SERPL-SCNC: 106 MMOL/L (ref 98–112)
CO2 SERPL-SCNC: 25 MMOL/L (ref 21–32)
CREAT BLD-MCNC: 0.72 MG/DL
GFR SERPLBLD BASED ON 1.73 SQ M-ARVRAT: 108 ML/MIN/1.73M2 (ref 60–?)
GLUCOSE BLD-MCNC: 96 MG/DL (ref 70–99)
OSMOLALITY SERPL CALC.SUM OF ELEC: 290 MOSM/KG (ref 275–295)
PHOSPHATE SERPL-MCNC: 4 MG/DL (ref 2.5–4.9)
POTASSIUM SERPL-SCNC: 4 MMOL/L (ref 3.5–5.1)
PTH-INTACT SERPL-MCNC: 45.5 PG/ML (ref 18.5–88)
SODIUM SERPL-SCNC: 140 MMOL/L (ref 136–145)
URATE SERPL-MCNC: 4.8 MG/DL

## 2023-02-14 PROCEDURE — 80069 RENAL FUNCTION PANEL: CPT

## 2023-02-14 PROCEDURE — 76775 US EXAM ABDO BACK WALL LIM: CPT | Performed by: UROLOGY

## 2023-02-14 PROCEDURE — 84550 ASSAY OF BLOOD/URIC ACID: CPT

## 2023-02-14 PROCEDURE — 36415 COLL VENOUS BLD VENIPUNCTURE: CPT

## 2023-02-14 PROCEDURE — 83970 ASSAY OF PARATHORMONE: CPT

## 2023-03-13 ENCOUNTER — HOSPITAL ENCOUNTER (EMERGENCY)
Facility: HOSPITAL | Age: 43
Discharge: HOME | End: 2023-03-13
Attending: EMERGENCY MEDICINE
Payer: COMMERCIAL

## 2023-03-13 ENCOUNTER — APPOINTMENT (OUTPATIENT)
Dept: CT IMAGING | Facility: HOSPITAL | Age: 43
End: 2023-03-13
Attending: EMERGENCY MEDICINE
Payer: COMMERCIAL

## 2023-03-13 VITALS
DIASTOLIC BLOOD PRESSURE: 84 MMHG | TEMPERATURE: 98 F | SYSTOLIC BLOOD PRESSURE: 122 MMHG | BODY MASS INDEX: 45.52 KG/M2 | HEIGHT: 67 IN | HEART RATE: 75 BPM | OXYGEN SATURATION: 100 % | RESPIRATION RATE: 20 BRPM | WEIGHT: 290 LBS

## 2023-03-13 DIAGNOSIS — N83.201 CYST OF RIGHT OVARY: Primary | ICD-10-CM

## 2023-03-13 LAB
ANION GAP SERPL CALC-SCNC: 8 MMOL/L (ref 0–18)
B-HCG UR QL: NEGATIVE
BASOPHILS # BLD AUTO: 0.04 X10(3) UL (ref 0–0.2)
BASOPHILS NFR BLD AUTO: 0.3 %
BILIRUB UR QL: NEGATIVE
BUN BLD-MCNC: 11 MG/DL (ref 7–18)
BUN/CREAT SERPL: 12.5 (ref 10–20)
CALCIUM BLD-MCNC: 9.4 MG/DL (ref 8.5–10.1)
CHLORIDE SERPL-SCNC: 105 MMOL/L (ref 98–112)
CO2 SERPL-SCNC: 26 MMOL/L (ref 21–32)
CREAT BLD-MCNC: 0.88 MG/DL
DEPRECATED RDW RBC AUTO: 40.6 FL (ref 35.1–46.3)
EOSINOPHIL # BLD AUTO: 0.37 X10(3) UL (ref 0–0.7)
EOSINOPHIL NFR BLD AUTO: 2.6 %
ERYTHROCYTE [DISTWIDTH] IN BLOOD BY AUTOMATED COUNT: 15.6 % (ref 11–15)
GFR SERPLBLD BASED ON 1.73 SQ M-ARVRAT: 85 ML/MIN/1.73M2 (ref 60–?)
GLUCOSE BLD-MCNC: 94 MG/DL (ref 70–99)
GLUCOSE UR-MCNC: NORMAL MG/DL
HCT VFR BLD AUTO: 38 %
HGB BLD-MCNC: 11.7 G/DL
HGB UR QL STRIP.AUTO: NEGATIVE
IMM GRANULOCYTES # BLD AUTO: 0.02 X10(3) UL (ref 0–1)
IMM GRANULOCYTES NFR BLD: 0.1 %
KETONES UR-MCNC: NEGATIVE MG/DL
LEUKOCYTE ESTERASE UR QL STRIP.AUTO: NEGATIVE
LYMPHOCYTES # BLD AUTO: 8.17 X10(3) UL (ref 1–4)
LYMPHOCYTES NFR BLD AUTO: 58.5 %
MCH RBC QN AUTO: 22.6 PG (ref 26–34)
MCHC RBC AUTO-ENTMCNC: 30.8 G/DL (ref 31–37)
MCV RBC AUTO: 73.4 FL
MONOCYTES # BLD AUTO: 0.71 X10(3) UL (ref 0.1–1)
MONOCYTES NFR BLD AUTO: 5.1 %
NEUTROPHILS # BLD AUTO: 4.66 X10 (3) UL (ref 1.5–7.7)
NEUTROPHILS # BLD AUTO: 4.66 X10(3) UL (ref 1.5–7.7)
NEUTROPHILS NFR BLD AUTO: 33.4 %
NITRITE UR QL STRIP.AUTO: NEGATIVE
OSMOLALITY SERPL CALC.SUM OF ELEC: 287 MOSM/KG (ref 275–295)
PH UR: 6 [PH] (ref 5–8)
PLATELET # BLD AUTO: 348 10(3)UL (ref 150–450)
POTASSIUM SERPL-SCNC: 3.9 MMOL/L (ref 3.5–5.1)
PROT UR-MCNC: NEGATIVE MG/DL
RBC # BLD AUTO: 5.18 X10(6)UL
SODIUM SERPL-SCNC: 139 MMOL/L (ref 136–145)
SP GR UR STRIP: 1.01 (ref 1–1.03)
UROBILINOGEN UR STRIP-ACNC: NORMAL
WBC # BLD AUTO: 14 X10(3) UL (ref 4–11)

## 2023-03-13 PROCEDURE — 99284 EMERGENCY DEPT VISIT MOD MDM: CPT

## 2023-03-13 PROCEDURE — 99285 EMERGENCY DEPT VISIT HI MDM: CPT

## 2023-03-13 PROCEDURE — 81001 URINALYSIS AUTO W/SCOPE: CPT | Performed by: EMERGENCY MEDICINE

## 2023-03-13 PROCEDURE — 85025 COMPLETE CBC W/AUTO DIFF WBC: CPT | Performed by: EMERGENCY MEDICINE

## 2023-03-13 PROCEDURE — 81025 URINE PREGNANCY TEST: CPT

## 2023-03-13 PROCEDURE — 96361 HYDRATE IV INFUSION ADD-ON: CPT

## 2023-03-13 PROCEDURE — 96374 THER/PROPH/DIAG INJ IV PUSH: CPT

## 2023-03-13 PROCEDURE — 74176 CT ABD & PELVIS W/O CONTRAST: CPT | Performed by: EMERGENCY MEDICINE

## 2023-03-13 PROCEDURE — 80048 BASIC METABOLIC PNL TOTAL CA: CPT | Performed by: EMERGENCY MEDICINE

## 2023-03-13 RX ORDER — TRAMADOL HYDROCHLORIDE 50 MG/1
TABLET ORAL EVERY 6 HOURS PRN
Qty: 10 TABLET | Refills: 0 | Status: SHIPPED | OUTPATIENT
Start: 2023-03-13 | End: 2023-03-18

## 2023-03-13 RX ORDER — KETOROLAC TROMETHAMINE 15 MG/ML
15 INJECTION, SOLUTION INTRAMUSCULAR; INTRAVENOUS ONCE
Status: COMPLETED | OUTPATIENT
Start: 2023-03-13 | End: 2023-03-13

## 2023-03-13 NOTE — ED INITIAL ASSESSMENT (HPI)
Left flank pain x few days. Pt states she had kidney stone removal around 4 weeks ago at [facility]. Pt denies any urinary s/s.

## 2023-03-13 NOTE — ED QUICK NOTES
Pt  Ambulatory to ED tx room 47 from triage. Pt A&O x 4, answering all ?s appropriately. Pt connected to cont pulse ox & BP. Pt here w/ c/o: L flank pain. Pt reporting L flank pain x \"a few days. \"  Pt reporting she had a kidney stone on same side that was removed approx 4 weeks ago & endorsing pain feels similar. Pt denies n/v/d, cp, sob, fevers/chills, dysuria. 20g L AC PIV inserted, flushes easily w/ 10cc NS, no s/s infiltration, secured w/ Tegaderm & tape. Labs collected, labeled & bedside, verified using 2 pt identifiers, & sent to lab per standing orders. Cart in low/locked position, side rails up x 2, call light w/ in reach.

## 2023-03-16 ENCOUNTER — OFFICE VISIT (OUTPATIENT)
Dept: OBGYN CLINIC | Facility: CLINIC | Age: 43
End: 2023-03-16

## 2023-03-16 VITALS
WEIGHT: 293 LBS | SYSTOLIC BLOOD PRESSURE: 126 MMHG | HEART RATE: 85 BPM | BODY MASS INDEX: 47 KG/M2 | DIASTOLIC BLOOD PRESSURE: 79 MMHG

## 2023-03-16 DIAGNOSIS — N83.202 LEFT OVARIAN CYST: Primary | ICD-10-CM

## 2023-03-16 PROCEDURE — 3078F DIAST BP <80 MM HG: CPT | Performed by: OBSTETRICS & GYNECOLOGY

## 2023-03-16 PROCEDURE — 99203 OFFICE O/P NEW LOW 30 MIN: CPT | Performed by: OBSTETRICS & GYNECOLOGY

## 2023-03-16 PROCEDURE — 3074F SYST BP LT 130 MM HG: CPT | Performed by: OBSTETRICS & GYNECOLOGY

## 2023-03-16 RX ORDER — IBUPROFEN 600 MG/1
600 TABLET ORAL EVERY 6 HOURS PRN
Qty: 30 TABLET | Refills: 0 | Status: SHIPPED | OUTPATIENT
Start: 2023-03-16 | End: 2023-03-24

## 2023-03-31 ENCOUNTER — HOSPITAL ENCOUNTER (EMERGENCY)
Facility: HOSPITAL | Age: 43
Discharge: HOME | End: 2023-03-31
Attending: EMERGENCY MEDICINE
Payer: COMMERCIAL

## 2023-03-31 ENCOUNTER — APPOINTMENT (OUTPATIENT)
Dept: CT IMAGING | Facility: HOSPITAL | Age: 43
End: 2023-03-31
Attending: EMERGENCY MEDICINE
Payer: COMMERCIAL

## 2023-03-31 VITALS
WEIGHT: 290 LBS | RESPIRATION RATE: 18 BRPM | HEIGHT: 67 IN | SYSTOLIC BLOOD PRESSURE: 111 MMHG | OXYGEN SATURATION: 100 % | TEMPERATURE: 98 F | HEART RATE: 84 BPM | BODY MASS INDEX: 45.52 KG/M2 | DIASTOLIC BLOOD PRESSURE: 72 MMHG

## 2023-03-31 DIAGNOSIS — R10.9 FLANK PAIN: Primary | ICD-10-CM

## 2023-03-31 LAB
ANION GAP SERPL CALC-SCNC: 4 MMOL/L (ref 0–18)
B-HCG UR QL: NEGATIVE
B-HCG UR QL: NEGATIVE
BASOPHILS # BLD AUTO: 0.05 X10(3) UL (ref 0–0.2)
BASOPHILS NFR BLD AUTO: 0.3 %
BILIRUB UR QL: NEGATIVE
BUN BLD-MCNC: 15 MG/DL (ref 7–18)
BUN/CREAT SERPL: 18.8 (ref 10–20)
CALCIUM BLD-MCNC: 8.6 MG/DL (ref 8.5–10.1)
CHLORIDE SERPL-SCNC: 108 MMOL/L (ref 98–112)
CO2 SERPL-SCNC: 28 MMOL/L (ref 21–32)
CREAT BLD-MCNC: 0.8 MG/DL
DEPRECATED RDW RBC AUTO: 42.3 FL (ref 35.1–46.3)
EOSINOPHIL # BLD AUTO: 0.28 X10(3) UL (ref 0–0.7)
EOSINOPHIL NFR BLD AUTO: 1.9 %
ERYTHROCYTE [DISTWIDTH] IN BLOOD BY AUTOMATED COUNT: 15.6 % (ref 11–15)
GFR SERPLBLD BASED ON 1.73 SQ M-ARVRAT: 95 ML/MIN/1.73M2 (ref 60–?)
GLUCOSE BLD-MCNC: 99 MG/DL (ref 70–99)
GLUCOSE UR-MCNC: NORMAL MG/DL
HCT VFR BLD AUTO: 37.7 %
HGB BLD-MCNC: 11.4 G/DL
HGB UR QL STRIP.AUTO: NEGATIVE
IMM GRANULOCYTES # BLD AUTO: 0.04 X10(3) UL (ref 0–1)
IMM GRANULOCYTES NFR BLD: 0.3 %
KETONES UR-MCNC: NEGATIVE MG/DL
LEUKOCYTE ESTERASE UR QL STRIP.AUTO: NEGATIVE
LYMPHOCYTES # BLD AUTO: 9.4 X10(3) UL (ref 1–4)
LYMPHOCYTES NFR BLD AUTO: 63.1 %
MCH RBC QN AUTO: 22.6 PG (ref 26–34)
MCHC RBC AUTO-ENTMCNC: 30.2 G/DL (ref 31–37)
MCV RBC AUTO: 74.8 FL
MONOCYTES # BLD AUTO: 0.69 X10(3) UL (ref 0.1–1)
MONOCYTES NFR BLD AUTO: 4.6 %
NEUTROPHILS # BLD AUTO: 4.44 X10 (3) UL (ref 1.5–7.7)
NEUTROPHILS # BLD AUTO: 4.44 X10(3) UL (ref 1.5–7.7)
NEUTROPHILS NFR BLD AUTO: 29.8 %
NITRITE UR QL STRIP.AUTO: NEGATIVE
OSMOLALITY SERPL CALC.SUM OF ELEC: 291 MOSM/KG (ref 275–295)
PH UR: 8 [PH] (ref 5–8)
PLATELET # BLD AUTO: 387 10(3)UL (ref 150–450)
POTASSIUM SERPL-SCNC: 4 MMOL/L (ref 3.5–5.1)
RBC # BLD AUTO: 5.04 X10(6)UL
SODIUM SERPL-SCNC: 140 MMOL/L (ref 136–145)
SP GR UR STRIP: 1.02 (ref 1–1.03)
UROBILINOGEN UR STRIP-ACNC: 2
WBC # BLD AUTO: 14.9 X10(3) UL (ref 4–11)

## 2023-03-31 PROCEDURE — 96374 THER/PROPH/DIAG INJ IV PUSH: CPT

## 2023-03-31 PROCEDURE — 81001 URINALYSIS AUTO W/SCOPE: CPT | Performed by: EMERGENCY MEDICINE

## 2023-03-31 PROCEDURE — 81025 URINE PREGNANCY TEST: CPT

## 2023-03-31 PROCEDURE — 80048 BASIC METABOLIC PNL TOTAL CA: CPT | Performed by: EMERGENCY MEDICINE

## 2023-03-31 PROCEDURE — 74177 CT ABD & PELVIS W/CONTRAST: CPT | Performed by: EMERGENCY MEDICINE

## 2023-03-31 PROCEDURE — 99284 EMERGENCY DEPT VISIT MOD MDM: CPT

## 2023-03-31 PROCEDURE — 85025 COMPLETE CBC W/AUTO DIFF WBC: CPT | Performed by: EMERGENCY MEDICINE

## 2023-03-31 RX ORDER — HYDROCODONE BITARTRATE AND ACETAMINOPHEN 5; 325 MG/1; MG/1
1-2 TABLET ORAL EVERY 6 HOURS PRN
Qty: 10 TABLET | Refills: 0 | Status: SHIPPED | OUTPATIENT
Start: 2023-03-31 | End: 2023-04-05

## 2023-03-31 RX ORDER — KETOROLAC TROMETHAMINE 30 MG/ML
30 INJECTION, SOLUTION INTRAMUSCULAR; INTRAVENOUS ONCE
Status: COMPLETED | OUTPATIENT
Start: 2023-03-31 | End: 2023-03-31

## 2023-03-31 NOTE — DISCHARGE INSTRUCTIONS
Take ibuprofen or Tylenol as needed for pain. Take Norco as needed for worsening pain. Follow-up with your primary physician. Return to the emergency department if you notice a rash or if increasing pain or other new symptoms develop.

## 2023-03-31 NOTE — ED INITIAL ASSESSMENT (HPI)
Pt to ED /w c/o left flank pain that started x1 week. Hx of kidney stones and ovarian cyst. Denies dysuria. Denies fevers. Pt is axox4. Denies any injuries.

## 2023-05-05 ENCOUNTER — HOSPITAL ENCOUNTER (EMERGENCY)
Facility: HOSPITAL | Age: 43
Discharge: HOME | End: 2023-05-05
Attending: EMERGENCY MEDICINE
Payer: COMMERCIAL

## 2023-05-05 ENCOUNTER — APPOINTMENT (OUTPATIENT)
Dept: GENERAL RADIOLOGY | Facility: HOSPITAL | Age: 43
End: 2023-05-05
Attending: EMERGENCY MEDICINE
Payer: COMMERCIAL

## 2023-05-05 ENCOUNTER — APPOINTMENT (OUTPATIENT)
Dept: CT IMAGING | Facility: HOSPITAL | Age: 43
End: 2023-05-05
Attending: EMERGENCY MEDICINE
Payer: COMMERCIAL

## 2023-05-05 VITALS
WEIGHT: 290 LBS | TEMPERATURE: 98 F | RESPIRATION RATE: 18 BRPM | SYSTOLIC BLOOD PRESSURE: 131 MMHG | DIASTOLIC BLOOD PRESSURE: 85 MMHG | BODY MASS INDEX: 45.52 KG/M2 | HEIGHT: 67 IN | HEART RATE: 93 BPM | OXYGEN SATURATION: 100 %

## 2023-05-05 DIAGNOSIS — R10.9 LEFT FLANK PAIN: Primary | ICD-10-CM

## 2023-05-05 DIAGNOSIS — D72.829 LEUKOCYTOSIS, UNSPECIFIED TYPE: ICD-10-CM

## 2023-05-05 LAB
ALBUMIN SERPL-MCNC: 3.8 G/DL (ref 3.4–5)
ALBUMIN/GLOB SERPL: 1 {RATIO} (ref 1–2)
ALP LIVER SERPL-CCNC: 56 U/L
ALT SERPL-CCNC: 17 U/L
ANION GAP SERPL CALC-SCNC: 8 MMOL/L (ref 0–18)
AST SERPL-CCNC: 11 U/L (ref 15–37)
B-HCG UR QL: NEGATIVE
BASOPHILS # BLD AUTO: 0.05 X10(3) UL (ref 0–0.2)
BASOPHILS NFR BLD AUTO: 0.3 %
BILIRUB SERPL-MCNC: 0.3 MG/DL (ref 0.1–2)
BILIRUB UR QL: NEGATIVE
BUN BLD-MCNC: 10 MG/DL (ref 7–18)
BUN/CREAT SERPL: 11.9 (ref 10–20)
CALCIUM BLD-MCNC: 9.1 MG/DL (ref 8.5–10.1)
CHLORIDE SERPL-SCNC: 107 MMOL/L (ref 98–112)
CLARITY UR: CLEAR
CO2 SERPL-SCNC: 25 MMOL/L (ref 21–32)
CREAT BLD-MCNC: 0.84 MG/DL
DEPRECATED RDW RBC AUTO: 39.3 FL (ref 35.1–46.3)
EOSINOPHIL # BLD AUTO: 0.31 X10(3) UL (ref 0–0.7)
EOSINOPHIL NFR BLD AUTO: 2 %
ERYTHROCYTE [DISTWIDTH] IN BLOOD BY AUTOMATED COUNT: 14.9 % (ref 11–15)
GFR SERPLBLD BASED ON 1.73 SQ M-ARVRAT: 90 ML/MIN/1.73M2 (ref 60–?)
GLOBULIN PLAS-MCNC: 3.8 G/DL (ref 2.8–4.4)
GLUCOSE BLD-MCNC: 85 MG/DL (ref 70–99)
GLUCOSE UR-MCNC: NORMAL MG/DL
HCT VFR BLD AUTO: 39.3 %
HGB BLD-MCNC: 11.9 G/DL
HGB UR QL STRIP.AUTO: NEGATIVE
IMM GRANULOCYTES # BLD AUTO: 0.04 X10(3) UL (ref 0–1)
IMM GRANULOCYTES NFR BLD: 0.3 %
KETONES UR-MCNC: NEGATIVE MG/DL
LEUKOCYTE ESTERASE UR QL STRIP.AUTO: NEGATIVE
LYMPHOCYTES # BLD AUTO: 10.08 X10(3) UL (ref 1–4)
LYMPHOCYTES NFR BLD AUTO: 64.5 %
MCH RBC QN AUTO: 22.2 PG (ref 26–34)
MCHC RBC AUTO-ENTMCNC: 30.3 G/DL (ref 31–37)
MCV RBC AUTO: 73.2 FL
MONOCYTES # BLD AUTO: 0.63 X10(3) UL (ref 0.1–1)
MONOCYTES NFR BLD AUTO: 4 %
NEUTROPHILS # BLD AUTO: 4.52 X10 (3) UL (ref 1.5–7.7)
NEUTROPHILS # BLD AUTO: 4.52 X10(3) UL (ref 1.5–7.7)
NEUTROPHILS NFR BLD AUTO: 28.9 %
NITRITE UR QL STRIP.AUTO: NEGATIVE
OSMOLALITY SERPL CALC.SUM OF ELEC: 288 MOSM/KG (ref 275–295)
PH UR: 6 [PH] (ref 5–8)
PLATELET # BLD AUTO: 446 10(3)UL (ref 150–450)
POTASSIUM SERPL-SCNC: 3.7 MMOL/L (ref 3.5–5.1)
PROT SERPL-MCNC: 7.6 G/DL (ref 6.4–8.2)
PROT UR-MCNC: NEGATIVE MG/DL
RBC # BLD AUTO: 5.37 X10(6)UL
SODIUM SERPL-SCNC: 140 MMOL/L (ref 136–145)
SP GR UR STRIP: 1.01 (ref 1–1.03)
UROBILINOGEN UR STRIP-ACNC: NORMAL
WBC # BLD AUTO: 15.6 X10(3) UL (ref 4–11)

## 2023-05-05 PROCEDURE — 80053 COMPREHEN METABOLIC PANEL: CPT | Performed by: EMERGENCY MEDICINE

## 2023-05-05 PROCEDURE — 96374 THER/PROPH/DIAG INJ IV PUSH: CPT

## 2023-05-05 PROCEDURE — 81025 URINE PREGNANCY TEST: CPT

## 2023-05-05 PROCEDURE — 99284 EMERGENCY DEPT VISIT MOD MDM: CPT

## 2023-05-05 PROCEDURE — 99285 EMERGENCY DEPT VISIT HI MDM: CPT

## 2023-05-05 PROCEDURE — 80053 COMPREHEN METABOLIC PANEL: CPT

## 2023-05-05 PROCEDURE — 74176 CT ABD & PELVIS W/O CONTRAST: CPT | Performed by: EMERGENCY MEDICINE

## 2023-05-05 PROCEDURE — 85025 COMPLETE CBC W/AUTO DIFF WBC: CPT

## 2023-05-05 PROCEDURE — 71045 X-RAY EXAM CHEST 1 VIEW: CPT | Performed by: EMERGENCY MEDICINE

## 2023-05-05 PROCEDURE — 85025 COMPLETE CBC W/AUTO DIFF WBC: CPT | Performed by: EMERGENCY MEDICINE

## 2023-05-05 RX ORDER — NAPROXEN 500 MG/1
500 TABLET ORAL 2 TIMES DAILY PRN
Qty: 14 TABLET | Refills: 0 | Status: SHIPPED | OUTPATIENT
Start: 2023-05-05 | End: 2023-05-12

## 2023-05-05 RX ORDER — DIAZEPAM 5 MG/1
5 TABLET ORAL 3 TIMES DAILY PRN
Qty: 12 TABLET | Refills: 0 | Status: SHIPPED | OUTPATIENT
Start: 2023-05-05 | End: 2023-05-12

## 2023-05-05 RX ORDER — KETOROLAC TROMETHAMINE 15 MG/ML
15 INJECTION, SOLUTION INTRAMUSCULAR; INTRAVENOUS ONCE
Status: COMPLETED | OUTPATIENT
Start: 2023-05-05 | End: 2023-05-05

## 2023-05-05 NOTE — ED INITIAL ASSESSMENT (HPI)
Patient ambulatory to ED with complaint of left flank pain. Hx of kidney stones and ovarian cysts. Pain x 1 week. Denies dysuria. Patient is AXOX4.

## 2023-05-05 NOTE — ED QUICK NOTES
DISCHARGE INSTRUCTIONS REVIEWED WITH THE PATIENT AND FAMILY. ALL QUESTIONS ANSWERED, PT AND FAMILY VERBALIZED UNDERSTANDING. PT AMBULATORY TO EXIT.

## 2023-07-06 ENCOUNTER — LAB ENCOUNTER (OUTPATIENT)
Dept: LAB | Facility: HOSPITAL | Age: 43
End: 2023-07-06
Attending: INTERNAL MEDICINE
Payer: COMMERCIAL

## 2023-07-06 ENCOUNTER — OFFICE VISIT (OUTPATIENT)
Dept: SURGERY | Facility: CLINIC | Age: 43
End: 2023-07-06
Payer: COMMERCIAL

## 2023-07-06 VITALS
WEIGHT: 280.88 LBS | OXYGEN SATURATION: 99 % | HEIGHT: 64.3 IN | SYSTOLIC BLOOD PRESSURE: 132 MMHG | HEART RATE: 92 BPM | BODY MASS INDEX: 47.95 KG/M2 | DIASTOLIC BLOOD PRESSURE: 78 MMHG

## 2023-07-06 DIAGNOSIS — R12 HEART BURN: Primary | ICD-10-CM

## 2023-07-06 DIAGNOSIS — E55.9 VITAMIN D DEFICIENCY: ICD-10-CM

## 2023-07-06 DIAGNOSIS — R73.09 ABNORMAL BLOOD SUGAR: ICD-10-CM

## 2023-07-06 DIAGNOSIS — R12 HEART BURN: ICD-10-CM

## 2023-07-06 DIAGNOSIS — E66.01 MORBID OBESITY WITH BMI OF 45.0-49.9, ADULT (HCC): ICD-10-CM

## 2023-07-06 LAB
CHOLEST SERPL-MCNC: 157 MG/DL (ref ?–200)
DEPRECATED HBV CORE AB SER IA-ACNC: 38 NG/ML
EST. AVERAGE GLUCOSE BLD GHB EST-MCNC: 114 MG/DL (ref 68–126)
FASTING PATIENT LIPID ANSWER: NO
FOLATE SERPL-MCNC: 4 NG/ML (ref 8.7–?)
HBA1C MFR BLD: 5.6 % (ref ?–5.7)
HDLC SERPL-MCNC: 47 MG/DL (ref 40–59)
IRON SATN MFR SERPL: 19 %
IRON SERPL-MCNC: 77 UG/DL
LDLC SERPL CALC-MCNC: 91 MG/DL (ref ?–100)
NONHDLC SERPL-MCNC: 110 MG/DL (ref ?–130)
TIBC SERPL-MCNC: 404 UG/DL (ref 240–450)
TRANSFERRIN SERPL-MCNC: 271 MG/DL (ref 200–360)
TRIGL SERPL-MCNC: 102 MG/DL (ref 30–149)
TSI SER-ACNC: 2.42 MIU/ML (ref 0.36–3.74)
VIT B12 SERPL-MCNC: 696 PG/ML (ref 193–986)
VIT D+METAB SERPL-MCNC: 12.5 NG/ML (ref 30–100)
VLDLC SERPL CALC-MCNC: 17 MG/DL (ref 0–30)

## 2023-07-06 PROCEDURE — 84466 ASSAY OF TRANSFERRIN: CPT

## 2023-07-06 PROCEDURE — 84443 ASSAY THYROID STIM HORMONE: CPT

## 2023-07-06 PROCEDURE — 82728 ASSAY OF FERRITIN: CPT

## 2023-07-06 PROCEDURE — 3078F DIAST BP <80 MM HG: CPT | Performed by: INTERNAL MEDICINE

## 2023-07-06 PROCEDURE — 36415 COLL VENOUS BLD VENIPUNCTURE: CPT

## 2023-07-06 PROCEDURE — 3008F BODY MASS INDEX DOCD: CPT | Performed by: INTERNAL MEDICINE

## 2023-07-06 PROCEDURE — 83540 ASSAY OF IRON: CPT

## 2023-07-06 PROCEDURE — 82746 ASSAY OF FOLIC ACID SERUM: CPT

## 2023-07-06 PROCEDURE — 82306 VITAMIN D 25 HYDROXY: CPT

## 2023-07-06 PROCEDURE — 84425 ASSAY OF VITAMIN B-1: CPT

## 2023-07-06 PROCEDURE — 99215 OFFICE O/P EST HI 40 MIN: CPT | Performed by: INTERNAL MEDICINE

## 2023-07-06 PROCEDURE — 82607 VITAMIN B-12: CPT

## 2023-07-06 PROCEDURE — 3075F SYST BP GE 130 - 139MM HG: CPT | Performed by: INTERNAL MEDICINE

## 2023-07-06 PROCEDURE — 80061 LIPID PANEL: CPT

## 2023-07-06 PROCEDURE — 83036 HEMOGLOBIN GLYCOSYLATED A1C: CPT

## 2023-07-06 RX ORDER — PHENTERMINE HYDROCHLORIDE 37.5 MG/1
37.5 TABLET ORAL
Qty: 30 TABLET | Refills: 2 | Status: SHIPPED | OUTPATIENT
Start: 2023-07-06

## 2023-07-07 DIAGNOSIS — E55.9 VITAMIN D DEFICIENCY: Primary | ICD-10-CM

## 2023-07-07 RX ORDER — ERGOCALCIFEROL 1.25 MG/1
50000 CAPSULE ORAL WEEKLY
Qty: 12 CAPSULE | Refills: 2 | Status: SHIPPED | OUTPATIENT
Start: 2023-07-07 | End: 2023-09-23

## 2023-07-09 LAB — VITAMIN B1 WHOLE BLD: 89.4 NMOL/L

## 2023-07-19 ENCOUNTER — OFFICE VISIT (OUTPATIENT)
Dept: SURGERY | Facility: CLINIC | Age: 43
End: 2023-07-19
Payer: COMMERCIAL

## 2023-07-19 VITALS — HEIGHT: 64.3 IN | BODY MASS INDEX: 47.8 KG/M2 | WEIGHT: 280 LBS

## 2023-07-19 DIAGNOSIS — R73.09 ABNORMAL BLOOD SUGAR: ICD-10-CM

## 2023-07-19 DIAGNOSIS — R12 HEART BURN: Primary | ICD-10-CM

## 2023-07-19 DIAGNOSIS — E55.9 VITAMIN D DEFICIENCY: ICD-10-CM

## 2023-07-19 DIAGNOSIS — E66.01 MORBID OBESITY WITH BMI OF 45.0-49.9, ADULT (HCC): ICD-10-CM

## 2023-07-19 PROCEDURE — 3008F BODY MASS INDEX DOCD: CPT

## 2023-07-19 PROCEDURE — 97802 MEDICAL NUTRITION INDIV IN: CPT

## 2023-07-19 NOTE — PATIENT INSTRUCTIONS
Goals: 1. Keep a food record, My Net Diary, select the macros dashboard or My Fitness Pal.  2.  Strive to consume at least 4-6 meals/snacks per day. Include protein and produce when you eat. Aim for 55-66 grams of protein per day. Try to keep the carbohydrates at 120 grams per day or less. 3.  Practice eating strategies, eat separately from drinking, avoid straws, chew food 20-30 times before swallowing. Make the meals last 30 minutes. 4.  Aim for 64 oz per day of water. (Try  Protein water, adding True Lemon, Crystal Light). 5.  Taper caffeine. 6.  Exercise with a goal of 30 minutes per day for exercise (for example,walking). 7.  Continue to strength training 10 minutes 3 days per week. 8.  Start vitamin D OTC.

## 2023-08-02 ENCOUNTER — TELEPHONE (OUTPATIENT)
Dept: OTHER | Age: 43
End: 2023-08-02

## 2023-08-08 ENCOUNTER — HOSPITAL ENCOUNTER (EMERGENCY)
Facility: HOSPITAL | Age: 43
Discharge: HOME | End: 2023-08-08
Attending: STUDENT IN AN ORGANIZED HEALTH CARE EDUCATION/TRAINING PROGRAM
Payer: COMMERCIAL

## 2023-08-08 ENCOUNTER — APPOINTMENT (OUTPATIENT)
Dept: ULTRASOUND IMAGING | Facility: HOSPITAL | Age: 43
End: 2023-08-08
Attending: STUDENT IN AN ORGANIZED HEALTH CARE EDUCATION/TRAINING PROGRAM
Payer: COMMERCIAL

## 2023-08-08 VITALS
RESPIRATION RATE: 17 BRPM | DIASTOLIC BLOOD PRESSURE: 90 MMHG | SYSTOLIC BLOOD PRESSURE: 133 MMHG | WEIGHT: 275 LBS | TEMPERATURE: 98 F | OXYGEN SATURATION: 100 % | HEART RATE: 81 BPM | BODY MASS INDEX: 44.2 KG/M2 | HEIGHT: 66 IN

## 2023-08-08 DIAGNOSIS — O20.9 VAGINAL BLEEDING AFFECTING EARLY PREGNANCY: Primary | ICD-10-CM

## 2023-08-08 LAB
ANION GAP SERPL CALC-SCNC: 4 MMOL/L (ref 0–18)
ANTIBODY SCREEN: NEGATIVE
B-HCG SERPL-ACNC: 55 MIU/ML
B-HCG UR QL: POSITIVE
B-HCG UR QL: POSITIVE
BILIRUB UR QL: NEGATIVE
BUN BLD-MCNC: 17 MG/DL (ref 7–18)
BUN/CREAT SERPL: 22.7 (ref 10–20)
CALCIUM BLD-MCNC: 8.6 MG/DL (ref 8.5–10.1)
CHLORIDE SERPL-SCNC: 112 MMOL/L (ref 98–112)
CLARITY UR: CLEAR
CO2 SERPL-SCNC: 24 MMOL/L (ref 21–32)
CREAT BLD-MCNC: 0.75 MG/DL
EGFRCR SERPLBLD CKD-EPI 2021: 103 ML/MIN/1.73M2 (ref 60–?)
GLUCOSE BLD-MCNC: 102 MG/DL (ref 70–99)
GLUCOSE UR-MCNC: NORMAL MG/DL
KETONES UR-MCNC: NEGATIVE MG/DL
LEUKOCYTE ESTERASE UR QL STRIP.AUTO: NEGATIVE
NITRITE UR QL STRIP.AUTO: NEGATIVE
OSMOLALITY SERPL CALC.SUM OF ELEC: 292 MOSM/KG (ref 275–295)
PH UR: 6 [PH] (ref 5–8)
POTASSIUM SERPL-SCNC: 4.2 MMOL/L (ref 3.5–5.1)
PROT UR-MCNC: NEGATIVE MG/DL
RH BLOOD TYPE: POSITIVE
SODIUM SERPL-SCNC: 140 MMOL/L (ref 136–145)
SP GR UR STRIP: 1.02 (ref 1–1.03)
UROBILINOGEN UR STRIP-ACNC: NORMAL

## 2023-08-08 PROCEDURE — 86850 RBC ANTIBODY SCREEN: CPT | Performed by: STUDENT IN AN ORGANIZED HEALTH CARE EDUCATION/TRAINING PROGRAM

## 2023-08-08 PROCEDURE — 76801 OB US < 14 WKS SINGLE FETUS: CPT | Performed by: STUDENT IN AN ORGANIZED HEALTH CARE EDUCATION/TRAINING PROGRAM

## 2023-08-08 PROCEDURE — 84702 CHORIONIC GONADOTROPIN TEST: CPT | Performed by: STUDENT IN AN ORGANIZED HEALTH CARE EDUCATION/TRAINING PROGRAM

## 2023-08-08 PROCEDURE — 80048 BASIC METABOLIC PNL TOTAL CA: CPT | Performed by: STUDENT IN AN ORGANIZED HEALTH CARE EDUCATION/TRAINING PROGRAM

## 2023-08-08 PROCEDURE — 76817 TRANSVAGINAL US OBSTETRIC: CPT | Performed by: STUDENT IN AN ORGANIZED HEALTH CARE EDUCATION/TRAINING PROGRAM

## 2023-08-08 PROCEDURE — 36415 COLL VENOUS BLD VENIPUNCTURE: CPT

## 2023-08-08 PROCEDURE — 86901 BLOOD TYPING SEROLOGIC RH(D): CPT | Performed by: STUDENT IN AN ORGANIZED HEALTH CARE EDUCATION/TRAINING PROGRAM

## 2023-08-08 PROCEDURE — 86900 BLOOD TYPING SEROLOGIC ABO: CPT | Performed by: STUDENT IN AN ORGANIZED HEALTH CARE EDUCATION/TRAINING PROGRAM

## 2023-08-08 PROCEDURE — 99284 EMERGENCY DEPT VISIT MOD MDM: CPT

## 2023-08-08 PROCEDURE — 81001 URINALYSIS AUTO W/SCOPE: CPT | Performed by: STUDENT IN AN ORGANIZED HEALTH CARE EDUCATION/TRAINING PROGRAM

## 2023-08-08 PROCEDURE — 81025 URINE PREGNANCY TEST: CPT

## 2023-08-08 PROCEDURE — 85060 BLOOD SMEAR INTERPRETATION: CPT | Performed by: STUDENT IN AN ORGANIZED HEALTH CARE EDUCATION/TRAINING PROGRAM

## 2023-08-08 PROCEDURE — 85025 COMPLETE CBC W/AUTO DIFF WBC: CPT | Performed by: STUDENT IN AN ORGANIZED HEALTH CARE EDUCATION/TRAINING PROGRAM

## 2023-08-08 NOTE — ED INITIAL ASSESSMENT (HPI)
Pt ambulatory to ED A&O x 4 w/ vaginal bleeding x 1 week, progressively worsening. Pt is approx 4 weeks pregnant, had (+) pregnancy test at MDs office. Pt denies any cramping. Blood is dark red, no clots, not saturating through pads. LMP 07/10/2023, .

## 2023-08-09 ENCOUNTER — TELEPHONE (OUTPATIENT)
Dept: OBGYN CLINIC | Facility: CLINIC | Age: 43
End: 2023-08-09

## 2023-08-09 LAB
BASOPHILS # BLD AUTO: 0.05 X10(3) UL (ref 0–0.2)
BASOPHILS NFR BLD AUTO: 0.3 %
DEPRECATED RDW RBC AUTO: 41.7 FL (ref 35.1–46.3)
EOSINOPHIL # BLD AUTO: 0.3 X10(3) UL (ref 0–0.7)
EOSINOPHIL NFR BLD AUTO: 2 %
ERYTHROCYTE [DISTWIDTH] IN BLOOD BY AUTOMATED COUNT: 15.9 % (ref 11–15)
HCT VFR BLD AUTO: 36.9 %
HGB BLD-MCNC: 11.3 G/DL
IMM GRANULOCYTES # BLD AUTO: 0.03 X10(3) UL (ref 0–1)
IMM GRANULOCYTES NFR BLD: 0.2 %
LYMPHOCYTES # BLD AUTO: 9.35 X10(3) UL (ref 1–4)
LYMPHOCYTES NFR BLD AUTO: 61.2 %
MCH RBC QN AUTO: 22.5 PG (ref 26–34)
MCHC RBC AUTO-ENTMCNC: 30.6 G/DL (ref 31–37)
MCV RBC AUTO: 73.5 FL
MONOCYTES # BLD AUTO: 0.64 X10(3) UL (ref 0.1–1)
MONOCYTES NFR BLD AUTO: 4.2 %
NEUTROPHILS # BLD AUTO: 4.91 X10 (3) UL (ref 1.5–7.7)
NEUTROPHILS # BLD AUTO: 4.91 X10(3) UL (ref 1.5–7.7)
NEUTROPHILS NFR BLD AUTO: 32.1 %
PLATELET # BLD AUTO: 387 10(3)UL (ref 150–450)
RBC # BLD AUTO: 5.02 X10(6)UL
WBC # BLD AUTO: 15.3 X10(3) UL (ref 4–11)

## 2023-08-09 NOTE — DISCHARGE INSTRUCTIONS
Your testing today is indeterminate and you require further blood testing in 48 hrs to determine if  your pregnancy is in the uterus (safe) or if it outside the uterus (a medical emergency called an  ectopic pregnancy.) Please call the OBGYN for an appointment in exactly 2 days. You will  need to have a blood test taken the same day as your appointment. Take the prescription for  the blood test to any lab center or to the OBGYN office that day. It is very important that you  return to the emergency department if you develop abdominal pain, if you faint, if you develop  dizziness, weakness, chest pain, difficulty breathing or if your vaginal bleeding is soaking > 1  pad per hr as these can be signs of an ectopic pregnancy.

## 2023-08-09 NOTE — TELEPHONE ENCOUNTER
Patient went to the ER yesterday, had a miscarriage, patient need a miscarriage follow up appointment with Dr Xenia Prieto. .. Carlton Nuñez   Please advise

## 2023-08-10 ENCOUNTER — LAB ENCOUNTER (OUTPATIENT)
Dept: LAB | Facility: HOSPITAL | Age: 43
End: 2023-08-10
Attending: STUDENT IN AN ORGANIZED HEALTH CARE EDUCATION/TRAINING PROGRAM
Payer: COMMERCIAL

## 2023-08-10 ENCOUNTER — OFFICE VISIT (OUTPATIENT)
Dept: OBGYN CLINIC | Facility: CLINIC | Age: 43
End: 2023-08-10

## 2023-08-10 VITALS
SYSTOLIC BLOOD PRESSURE: 122 MMHG | BODY MASS INDEX: 44.03 KG/M2 | WEIGHT: 274 LBS | DIASTOLIC BLOOD PRESSURE: 88 MMHG | HEIGHT: 66 IN | HEART RATE: 80 BPM

## 2023-08-10 DIAGNOSIS — O20.0 THREATENED ABORTION, ANTEPARTUM: Primary | ICD-10-CM

## 2023-08-10 DIAGNOSIS — O03.9 COMPLETE ABORTION: ICD-10-CM

## 2023-08-10 LAB — B-HCG SERPL-ACNC: 42 MIU/ML

## 2023-08-10 PROCEDURE — 3079F DIAST BP 80-89 MM HG: CPT | Performed by: STUDENT IN AN ORGANIZED HEALTH CARE EDUCATION/TRAINING PROGRAM

## 2023-08-10 PROCEDURE — 3074F SYST BP LT 130 MM HG: CPT | Performed by: STUDENT IN AN ORGANIZED HEALTH CARE EDUCATION/TRAINING PROGRAM

## 2023-08-10 PROCEDURE — 36415 COLL VENOUS BLD VENIPUNCTURE: CPT

## 2023-08-10 PROCEDURE — 3008F BODY MASS INDEX DOCD: CPT | Performed by: STUDENT IN AN ORGANIZED HEALTH CARE EDUCATION/TRAINING PROGRAM

## 2023-08-10 PROCEDURE — 99203 OFFICE O/P NEW LOW 30 MIN: CPT | Performed by: STUDENT IN AN ORGANIZED HEALTH CARE EDUCATION/TRAINING PROGRAM

## 2023-08-10 PROCEDURE — 84702 CHORIONIC GONADOTROPIN TEST: CPT

## 2023-08-10 RX ORDER — TRETINOIN 0.5 MG/G
CREAM TOPICAL
COMMUNITY
Start: 2023-06-29

## 2023-08-10 RX ORDER — METRONIDAZOLE 10 MG/G
1 GEL TOPICAL DAILY
COMMUNITY
Start: 2023-08-09

## 2023-08-10 RX ORDER — CLINDAMYCIN PHOSPHATE 10 MG/G
1 GEL TOPICAL EVERY MORNING
COMMUNITY

## 2023-08-10 RX ORDER — HYDROQUINONE 40 MG/G
CREAM TOPICAL
COMMUNITY
Start: 2023-06-27

## 2023-08-10 RX ORDER — HYDROQUINONE 40 MG/G
CREAM TOPICAL
COMMUNITY

## 2023-08-10 RX ORDER — DOXYCYCLINE HYCLATE 100 MG/1
1 CAPSULE ORAL 2 TIMES DAILY
COMMUNITY

## 2023-08-10 RX ORDER — METRONIDAZOLE 500 MG/1
500 TABLET ORAL 2 TIMES DAILY
Qty: 14 TABLET | Refills: 0 | Status: SHIPPED | OUTPATIENT
Start: 2023-08-10 | End: 2023-08-17

## 2023-08-23 ENCOUNTER — OFFICE VISIT (OUTPATIENT)
Dept: SURGERY | Facility: CLINIC | Age: 43
End: 2023-08-23
Payer: COMMERCIAL

## 2023-08-23 DIAGNOSIS — E66.01 MORBID OBESITY WITH BMI OF 45.0-49.9, ADULT (HCC): Primary | ICD-10-CM

## 2023-08-23 DIAGNOSIS — R12 HEART BURN: ICD-10-CM

## 2023-08-23 DIAGNOSIS — K21.9 LARYNGOPHARYNGEAL REFLUX (LPR): ICD-10-CM

## 2023-08-23 PROCEDURE — 97803 MED NUTRITION INDIV SUBSEQ: CPT

## 2023-08-23 NOTE — PATIENT INSTRUCTIONS
Recommendations/goals:    1. Keep a food record, My Net Diary, select the macros dashboard or My Fitness Pal.  2.  Strive to consume at least 4-6 meals/snacks per day. Include protein and produce when you eat. Aim for 55-66 grams of protein per day. Try to keep the carbohydrates at 120 grams per day or less. 3.  Continue to practice eating strategies, eat separately from drinking, avoid straws, chew food 20-30 times before swallowing. Make the meals last 30 minutes. 4.  Continue to drink at least 64 oz per day of water. (Try  Protein water, adding True Lemon, Crystal Light). 5.  Continue to exercise with a goal of 30 minutes per day for exercise (for example,walking). 6.  Continue to strength training 10 minutes 3 days per week. Focus on left arm and legs. 7.  Do the quizzes in the binder on pages 18-24 for review at next visit.

## 2023-08-23 NOTE — PROGRESS NOTES
[address] [facility]  [address] [facility],[address]  Dept: [phone]  Loc: [phone]  Body Composition Analysis #1     1. Weight 276.9 lbs     2. BMI 47.1     3. BMR 1615 kcal     4. Percent body fat 54.1% (149.7 lbs)     5. Visceral fat level 20 (goal is 10 )     6. ECW/TBW 0.386     7. SMM (skeletal muscle mass) 70.5 lbs                  Lean body mass for arms (R & L) 7.69 lbs, 120.8% & 7.45 lbs, 116.9%                  Lean body mass for trunk 60.3 lbs, 107.6%                   Lean body mass for legs (R & L) 17.44 lbs, 87.9% & 17.31 lbs, 87.3%     8. Body fat mass 149.7 lbs      9. Recommended Body fat amount loss 111.8 lbs    10. Recommendations/Status see below       08/23/23      Bariatric Follow-up Nutrition Session    Marianna Cochran is a 38year old female.      Assessment     Procedure:  Gastric Bypass and Vertical Sleeve Gastrectomy  Here for medical weight management: yes  Revision:  n/a  Surgery Date:  TBD  Medical Diagnosis:  obesity grade III    Labs:  Lab Results   Component Value Date     (H) 08/08/2023    BUN 17 08/08/2023    BUNCREA 22.7 (H) 08/08/2023    CREATSERUM 0.75 08/08/2023    ANIONGAP 4 08/08/2023    CA 8.6 08/08/2023    OSMOCALC 292 08/08/2023    ALKPHO 56 05/05/2023    AST 11 (L) 05/05/2023    ALT 17 05/05/2023    BILT 0.3 05/05/2023    TP 7.6 05/05/2023    ALB 3.8 05/05/2023    GLOBULIN 3.8 05/05/2023     08/08/2023    K 4.2 08/08/2023     08/08/2023    CO2 24.0 08/08/2023     No results found for: MG   Phosphorus (mg/dL)   Date Value   02/14/2023 4.0       Thyroid:    Lab Results   Component Value Date    TSH 2.420 07/06/2023       Iron Panel:  Lab Results   Component Value Date    IRON 77 07/06/2023    TRANSFERRIN 271 07/06/2023    TIBCP 404 07/06/2023    SAT 19 07/06/2023       CBC:  Lab Results   Component Value Date    WBC 15.3 (H) 08/08/2023    WBC 15.6 (H) 05/05/2023    WBC 14.9 (H) 03/31/2023 Lab Results   Component Value Date    HGB 11.3 (L) 08/08/2023    HGB 11.9 (L) 05/05/2023    HGB 11.4 (L) 03/31/2023      Lab Results   Component Value Date    .0 08/08/2023    .0 05/05/2023    .0 03/31/2023       Diabetes:    Lab Results   Component Value Date     07/06/2023    A1C 5.6 07/06/2023       Lipid Panel:  Lab Results   Component Value Date    CHOLEST 157 07/06/2023    TRIG 102 07/06/2023    HDL 47 07/06/2023    LDL 91 07/06/2023    VLDL 17 07/06/2023    NONHDLC 110 07/06/2023        Vitamins/Minerals:  Lab Results   Component Value Date    B12 696 07/06/2023     Lab Results   Component Value Date    VITD 12.5 (L) 07/06/2023     No results found for: THIAMINE   Lab Results   Component Value Date/Time    VITB1 89.4 07/06/2023 09:35 AM     Lab Results   Component Value Date/Time    FOLIC 4.0 (L) 86/39/2225 09:35 AM        Meds:     Current Outpatient Medications:     Hydroquinone 4 % External Cream, APPLY ONCE TO TWICE DAILY TO HYPERPIGMENTED SKIN ONLY (Patient not taking: Reported on 8/10/2023), Disp: , Rfl:     Clindamycin Phosphate 1 % External Gel, Apply 1 Application topically every morning. (Patient not taking: Reported on 8/10/2023), Disp: , Rfl:     doxycycline 100 MG Oral Cap, Take 1 capsule (100 mg total) by mouth 2 (two) times daily. (Patient not taking: Reported on 8/10/2023), Disp: , Rfl:     Hydroquinone 4 % External Cream, APPLY ONCE TO TWICE DAILY TO HYPERPIGMENTED SKIN ONLY (Patient not taking: Reported on 8/10/2023), Disp: , Rfl:     metroNIDAZOLE 1 % External Gel, Apply 1 Application topically daily. , Disp: , Rfl:     tretinoin 0.025 % External Cream, APPLY A PEA SIZED AMOUNT TO THE AFFECTED AREA OF FACE AT BEDTIME (Patient not taking: Reported on 8/10/2023), Disp: , Rfl:     Tretinoin 0.05 % External Cream, , Disp: , Rfl:     ergocalciferol 1.25 MG (22895 UT) Oral Cap, Take 1 capsule (50,000 Units total) by mouth once a week for 12 doses.  (Patient not taking: Reported on 8/10/2023), Disp: 12 capsule, Rfl: 2    Phentermine HCl 37.5 MG Oral Tab, Take 1 tablet (37.5 mg total) by mouth every morning before breakfast. (Patient not taking: Reported on 8/10/2023), Disp: 30 tablet, Rfl: 2    ibuprofen 200 MG Oral Tab, Take 200 mg by mouth every 6 (six) hours as needed for Pain. (Patient not taking: Reported on 3/16/2023), Disp: , Rfl:     Fexofenadine-Pseudoephed -240 MG Oral Tablet 24 Hr, Take 1 tablet by mouth daily. (Patient not taking: Reported on 3/16/2023), Disp: 30 tablet, Rfl: 11    Fexofenadine-Pseudoephed -240 MG Oral Tablet 24 Hr, Take 1 tablet by mouth daily. (Patient not taking: Reported on 3/16/2023), Disp: 30 tablet, Rfl: 3    Height:  Ht Readings from Last 1 Encounters:  08/10/23 : 5' 6\" (1.676 m)    Weight:  Wt Readings from Last 6 Encounters:  08/10/23 : 274 lb  08/08/23 : 275 lb  07/19/23 : 280 lb  07/06/23 : 280 lb 14.4 oz  05/05/23 : 290 lb  03/31/23 : 290 lb      Weight change:  down 3.1 lbs since lov 1 mo ago  Post-Op Excess Body Weight Loss: n/a % EBWL  BMI: There is no height or weight on file to calculate BMI.     Protein Intake: 49 grams/day  Fluid intake:  83 ounces/day    Diet history:       Breakfast      AM Snack       Lunch     PM Snack     Dinner   Egg chicken sausage spinach egg bite [employer] 4  skip Skip or turkey jerky skip 4 pm grilled chicken salad cucumber               Total Calories:  unsure not currently keeping a food record  Excessive in: nothing  Inadequate in:  protein, fruits, vegetables, and fiber     Patient has made some modifications and adjustments to diet: yes, has practiced eating separately from drinking, has practiced chewing food well prior to swallowing, is eating smaller portions at meals,  Food intolerances:  no  Vitamin/mineral supplements:  Vit D  Protein supplements:  Other no      Activity Level: active  Type: walk and other: move boxes  Duration: est 8,000 steps 5 days per week  Frequency: per day    Other: Met with pt for pre-op visit. Per pt did have a miscarriage since LOV. Per pt has restarted taking phentermine. Per pt feels phentermine is working to control appetite. Per pt has attended the July 17th seminar. Per pt has not started a food record, however, states she has been more mindful of choosing healthier foods since LOV. Per diet recall, pt is estimated to be consuming approximately 49 grams of protein per day. Discussed benefits of eating at least 4-6 times per day in order to keep portion sizes small. Per verbalized understanding. Per pt is taking OTC vit D gummie, noted low value on July 2023 lab. Per pt has practiced eating separately from drinking, has practiced chewing food well prior to swallowing, and is eating smaller portions at meals. Per pt is drinking adequate amounts of fluids per day. 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 Reviewed body comp. Pt encouraged to focus on left arm and bend at knees when moving boxes. Pt verbalized understanding. Pt admits to using her back too much to lift boxes currently. Pt with scheduled visit for next month to review quizzes. Nutrition Diagnosis     Nutrition Diagnosis: Morbid obesity related to undesirable food choices and physical inactivity as evidenced by history     Intervention     Recommendations/goals:    1. Keep a food record, My Net Diary, select the macros dashboard or My Fitness Pal.  2.  Strive to consume at least 4-6 meals/snacks per day. Include protein and produce when you eat. Aim for 55-66 grams of protein per day. Try to keep the carbohydrates at 120 grams per day or less. 3.  Continue to practice eating strategies, eat separately from drinking, avoid straws, chew food 20-30 times before swallowing. Make the meals last 30 minutes. 4.  Continue to drink at least 64 oz per day of water.   (Try  Protein water, adding True Lemon, Crystal Light). 5.  Continue to exercise with a goal of 30 minutes per day for exercise (for example,walking). 6.  Continue to strength training 10 minutes 3 days per week. Focus on left arm and legs. 7.  Do the quizzes on pages 18-24 for review at next visit. Monitor/Evaluate     Anthropometric measurements, Food/fluid intake/choices, Food intolerances, Activity level, Vitamin/mineral supplementation, Reinforce goals, and Calorie/protein intake  Additional RD visits required to review concepts? yes  Patient understands protein requirements? yes  Patient understand fluid requirements (amount and method of intake)? yes  Patient understands post-operative diet? yes  Patient keeping consistent food records? No, did download berkley during visit  Patient ready for Liquid Protein Education?  no      Gem Damico, RD,LDN  Face-to-face time spent with pt: 45 minutes

## 2023-08-31 ENCOUNTER — OFFICE VISIT (OUTPATIENT)
Dept: OBGYN CLINIC | Facility: CLINIC | Age: 43
End: 2023-08-31

## 2023-08-31 ENCOUNTER — LAB ENCOUNTER (OUTPATIENT)
Dept: LAB | Facility: HOSPITAL | Age: 43
End: 2023-08-31
Attending: STUDENT IN AN ORGANIZED HEALTH CARE EDUCATION/TRAINING PROGRAM
Payer: COMMERCIAL

## 2023-08-31 VITALS
SYSTOLIC BLOOD PRESSURE: 126 MMHG | BODY MASS INDEX: 44.52 KG/M2 | HEART RATE: 93 BPM | HEIGHT: 66 IN | DIASTOLIC BLOOD PRESSURE: 86 MMHG | WEIGHT: 277 LBS

## 2023-08-31 DIAGNOSIS — Z11.3 SCREEN FOR STD (SEXUALLY TRANSMITTED DISEASE): ICD-10-CM

## 2023-08-31 DIAGNOSIS — O03.9 COMPLETE ABORTION: Primary | ICD-10-CM

## 2023-08-31 DIAGNOSIS — Z12.4 SCREENING FOR CERVICAL CANCER: ICD-10-CM

## 2023-08-31 DIAGNOSIS — Z01.419 WOMEN'S ANNUAL ROUTINE GYNECOLOGICAL EXAMINATION: ICD-10-CM

## 2023-08-31 DIAGNOSIS — Z12.31 SCREENING MAMMOGRAM FOR BREAST CANCER: ICD-10-CM

## 2023-08-31 LAB
CONTROL LINE PRESENT WITH A CLEAR BACKGROUND (YES/NO): YES YES/NO
HBV SURFACE AG SER-ACNC: <0.1 [IU]/L
HBV SURFACE AG SERPL QL IA: NONREACTIVE
HCV AB SERPL QL IA: NONREACTIVE
KIT LOT #: NORMAL NUMERIC
PREGNANCY TEST, URINE: NEGATIVE

## 2023-08-31 PROCEDURE — 86592 SYPHILIS TEST NON-TREP QUAL: CPT

## 2023-08-31 PROCEDURE — 87340 HEPATITIS B SURFACE AG IA: CPT

## 2023-08-31 PROCEDURE — 86780 TREPONEMA PALLIDUM: CPT

## 2023-08-31 PROCEDURE — 86593 SYPHILIS TEST NON-TREP QUANT: CPT

## 2023-08-31 PROCEDURE — 86803 HEPATITIS C AB TEST: CPT

## 2023-08-31 PROCEDURE — 99396 PREV VISIT EST AGE 40-64: CPT | Performed by: STUDENT IN AN ORGANIZED HEALTH CARE EDUCATION/TRAINING PROGRAM

## 2023-08-31 PROCEDURE — 3074F SYST BP LT 130 MM HG: CPT | Performed by: STUDENT IN AN ORGANIZED HEALTH CARE EDUCATION/TRAINING PROGRAM

## 2023-08-31 PROCEDURE — 87389 HIV-1 AG W/HIV-1&-2 AB AG IA: CPT

## 2023-08-31 PROCEDURE — 36415 COLL VENOUS BLD VENIPUNCTURE: CPT

## 2023-08-31 PROCEDURE — 81025 URINE PREGNANCY TEST: CPT | Performed by: STUDENT IN AN ORGANIZED HEALTH CARE EDUCATION/TRAINING PROGRAM

## 2023-08-31 PROCEDURE — 3079F DIAST BP 80-89 MM HG: CPT | Performed by: STUDENT IN AN ORGANIZED HEALTH CARE EDUCATION/TRAINING PROGRAM

## 2023-08-31 PROCEDURE — 3008F BODY MASS INDEX DOCD: CPT | Performed by: STUDENT IN AN ORGANIZED HEALTH CARE EDUCATION/TRAINING PROGRAM

## 2023-09-01 LAB
C TRACH DNA SPEC QL NAA+PROBE: NEGATIVE
HPV I/H RISK 1 DNA SPEC QL NAA+PROBE: NEGATIVE
N GONORRHOEA DNA SPEC QL NAA+PROBE: NEGATIVE
T PALLIDUM AB SER QL: POSITIVE

## 2023-09-02 LAB — RPR SER QL: REACTIVE

## 2023-09-05 ENCOUNTER — TELEPHONE (OUTPATIENT)
Dept: OBGYN CLINIC | Facility: CLINIC | Age: 43
End: 2023-09-05

## 2023-09-05 DIAGNOSIS — A53.0 LATENT SYPHILIS WITH POSITIVE SEROLOGY: Primary | ICD-10-CM

## 2023-09-05 NOTE — TELEPHONE ENCOUNTER
Verified namd and . Discussed positive RPR results. Patient states she had be dx with syphilis and treated with Doxycyline early this year. She was also referred to an allergist for penicillin desensitization. Patient went for a few appointments but stopped going due to cost. She recently had an STI screening done with her PCP early in August but is not sure of results. She states she will call and discuss with them. I will give her a referral for infectious disease due to PCN allergy and persistent infection. Pt in agreement. All questions answered.

## 2023-09-05 NOTE — TELEPHONE ENCOUNTER
LMTCB    Will discuss +RPR with patient. 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 Unsure if patient followed up with Allergist. Will most likely refer to ID.

## 2023-09-22 ENCOUNTER — OFFICE VISIT (OUTPATIENT)
Dept: SURGERY | Facility: CLINIC | Age: 43
End: 2023-09-22
Payer: COMMERCIAL

## 2023-09-22 ENCOUNTER — OFFICE VISIT (OUTPATIENT)
Dept: PULMONOLOGY | Facility: CLINIC | Age: 43
End: 2023-09-22

## 2023-09-22 VITALS
OXYGEN SATURATION: 100 % | SYSTOLIC BLOOD PRESSURE: 130 MMHG | DIASTOLIC BLOOD PRESSURE: 67 MMHG | BODY MASS INDEX: 43.32 KG/M2 | HEART RATE: 65 BPM | WEIGHT: 276 LBS | HEIGHT: 67 IN

## 2023-09-22 VITALS
BODY MASS INDEX: 47.46 KG/M2 | WEIGHT: 278 LBS | SYSTOLIC BLOOD PRESSURE: 130 MMHG | HEART RATE: 65 BPM | HEIGHT: 64.3 IN | OXYGEN SATURATION: 100 % | DIASTOLIC BLOOD PRESSURE: 67 MMHG

## 2023-09-22 DIAGNOSIS — Z51.81 ENCOUNTER FOR THERAPEUTIC DRUG MONITORING: ICD-10-CM

## 2023-09-22 DIAGNOSIS — E66.01 MORBID OBESITY WITH BMI OF 45.0-49.9, ADULT (HCC): ICD-10-CM

## 2023-09-22 DIAGNOSIS — R12 HEART BURN: Primary | ICD-10-CM

## 2023-09-22 DIAGNOSIS — Z01.811 ENCOUNTER FOR PREOPERATIVE PULMONARY EXAMINATION: Primary | ICD-10-CM

## 2023-09-22 PROCEDURE — 99203 OFFICE O/P NEW LOW 30 MIN: CPT | Performed by: PHYSICIAN ASSISTANT

## 2023-09-22 PROCEDURE — 3008F BODY MASS INDEX DOCD: CPT | Performed by: PHYSICIAN ASSISTANT

## 2023-09-22 PROCEDURE — 3078F DIAST BP <80 MM HG: CPT | Performed by: PHYSICIAN ASSISTANT

## 2023-09-22 PROCEDURE — 3008F BODY MASS INDEX DOCD: CPT | Performed by: INTERNAL MEDICINE

## 2023-09-22 PROCEDURE — 3075F SYST BP GE 130 - 139MM HG: CPT | Performed by: PHYSICIAN ASSISTANT

## 2023-09-22 PROCEDURE — 3078F DIAST BP <80 MM HG: CPT | Performed by: INTERNAL MEDICINE

## 2023-09-22 PROCEDURE — 99214 OFFICE O/P EST MOD 30 MIN: CPT | Performed by: INTERNAL MEDICINE

## 2023-09-22 PROCEDURE — 3075F SYST BP GE 130 - 139MM HG: CPT | Performed by: INTERNAL MEDICINE

## 2023-09-22 RX ORDER — PHENTERMINE HYDROCHLORIDE 37.5 MG/1
37.5 TABLET ORAL
Qty: 30 TABLET | Refills: 2 | Status: SHIPPED | OUTPATIENT
Start: 2023-09-22

## 2023-09-26 ENCOUNTER — TELEPHONE (OUTPATIENT)
Dept: OBGYN CLINIC | Facility: CLINIC | Age: 43
End: 2023-09-26

## 2023-09-26 NOTE — TELEPHONE ENCOUNTER
This RN called Crystal back in regard to pt's RPR test and treatment for pt. Provided her with information that pt was referred to ID due to recurrent infection and her allergy to PCN. Provided her with ID information. No further questions.

## 2023-09-27 ENCOUNTER — OFFICE VISIT (OUTPATIENT)
Dept: SURGERY | Facility: CLINIC | Age: 43
End: 2023-09-27
Payer: COMMERCIAL

## 2023-09-27 VITALS
SYSTOLIC BLOOD PRESSURE: 126 MMHG | OXYGEN SATURATION: 99 % | WEIGHT: 276 LBS | BODY MASS INDEX: 47.12 KG/M2 | HEART RATE: 69 BPM | DIASTOLIC BLOOD PRESSURE: 80 MMHG | HEIGHT: 64.3 IN

## 2023-09-27 DIAGNOSIS — R12 HEART BURN: ICD-10-CM

## 2023-09-27 DIAGNOSIS — E66.01 MORBID OBESITY WITH BMI OF 45.0-49.9, ADULT (HCC): Primary | ICD-10-CM

## 2023-09-27 DIAGNOSIS — Z51.81 ENCOUNTER FOR THERAPEUTIC DRUG MONITORING: ICD-10-CM

## 2023-09-27 DIAGNOSIS — R63.5 WEIGHT GAIN: ICD-10-CM

## 2023-09-27 PROCEDURE — 97803 MED NUTRITION INDIV SUBSEQ: CPT

## 2023-09-27 NOTE — PATIENT INSTRUCTIONS
Recommendations/goals:    1. Keep a food record, My Net Diary, select the macros dashboard. 2.  Add  a meal and snack. Strive to consume at least 4-6 meals/snacks per day. Include protein and produce when you eat. Aim for 55-66 grams of protein per day. Try to keep the carbohydrates at 120 grams per day or less. (Bring hummus and veggies to work, or peanut butter and apples or HB eggs or nuts)  3. Continue to practice eating strategies, eat separately from drinking, avoid straws, chew food 20-30 times before swallowing. Make the meals last 30 minutes. 4.  Continue to drink at least 64 oz per day of water. (Try  Protein water, adding True Lemon, Crystal Light). 5.  Continue to exercise with a goal of 30 minutes per day for exercise (for example,walking). 6.  Continue to strength training 10 minutes 3 days per week. Focus on left arm and legs. 7.  Do the quizzes on pages 18-24 for review at next visit.

## 2023-09-27 NOTE — PROGRESS NOTES
[facility]  [address] [address]  Dept: [phone]  Loc: [phone]    09/27/23      Bariatric Follow-up Nutrition Session    Reginaldo Hampton is a 38year old female.      Assessment     Procedure:  Vertical Sleeve Gastrectomy  Here for medical weight management: no  Revision:  n/a  Surgery Date:  TBD  Medical Diagnosis:  obesity grade III     Labs:  Lab Results   Component Value Date     (H) 08/08/2023    BUN 17 08/08/2023    BUNCREA 22.7 (H) 08/08/2023    CREATSERUM 0.75 08/08/2023    ANIONGAP 4 08/08/2023    CA 8.6 08/08/2023    OSMOCALC 292 08/08/2023    ALKPHO 56 05/05/2023    AST 11 (L) 05/05/2023    ALT 17 05/05/2023    BILT 0.3 05/05/2023    TP 7.6 05/05/2023    ALB 3.8 05/05/2023    GLOBULIN 3.8 05/05/2023     08/08/2023    K 4.2 08/08/2023     08/08/2023    CO2 24.0 08/08/2023     No results found for: \"MG\"   Phosphorus (mg/dL)   Date Value   02/14/2023 4.0       Thyroid:    Lab Results   Component Value Date    TSH 2.420 07/06/2023       Iron Panel:  Lab Results   Component Value Date    IRON 77 07/06/2023    TRANSFERRIN 271 07/06/2023    TIBCP 404 07/06/2023    SAT 19 07/06/2023       CBC:  Lab Results   Component Value Date    WBC 15.3 (H) 08/08/2023    WBC 15.6 (H) 05/05/2023    WBC 14.9 (H) 03/31/2023     Lab Results   Component Value Date    HGB 11.3 (L) 08/08/2023    HGB 11.9 (L) 05/05/2023    HGB 11.4 (L) 03/31/2023      Lab Results   Component Value Date    .0 08/08/2023    .0 05/05/2023    .0 03/31/2023       Diabetes:    Lab Results   Component Value Date     07/06/2023    A1C 5.6 07/06/2023       Lipid Panel:  Lab Results   Component Value Date    CHOLEST 157 07/06/2023    TRIG 102 07/06/2023    HDL 47 07/06/2023    LDL 91 07/06/2023    VLDL 17 07/06/2023    NONHDLC 110 07/06/2023        Vitamins/Minerals:  Lab Results   Component Value Date    B12 696 07/06/2023 Lab Results   Component Value Date    VITD 12.5 (L) 07/06/2023     No results found for: \"THIAMINE\"   Lab Results   Component Value Date/Time    VITB1 89.4 07/06/2023 09:35 AM     Lab Results   Component Value Date/Time    FOLIC 4.0 (L) 15/47/1507 09:35 AM        Meds:     Current Outpatient Medications:     Phentermine HCl 37.5 MG Oral Tab, Take 1 tablet (37.5 mg total) by mouth every morning before breakfast., Disp: 30 tablet, Rfl: 2    Hydroquinone 4 % External Cream, APPLY ONCE TO TWICE DAILY TO HYPERPIGMENTED SKIN ONLY (Patient not taking: Reported on 8/10/2023), Disp: , Rfl:     Clindamycin Phosphate 1 % External Gel, Apply 1 Application topically every morning. (Patient not taking: Reported on 8/10/2023), Disp: , Rfl:     doxycycline 100 MG Oral Cap, Take 1 capsule (100 mg total) by mouth 2 (two) times daily. (Patient not taking: Reported on 8/10/2023), Disp: , Rfl:     Hydroquinone 4 % External Cream, APPLY ONCE TO TWICE DAILY TO HYPERPIGMENTED SKIN ONLY (Patient not taking: Reported on 8/10/2023), Disp: , Rfl:     tretinoin 0.025 % External Cream, APPLY A PEA SIZED AMOUNT TO THE AFFECTED AREA OF FACE AT BEDTIME (Patient not taking: Reported on 8/10/2023), Disp: , Rfl:     Tretinoin 0.05 % External Cream, , Disp: , Rfl:     ibuprofen 200 MG Oral Tab, Take 200 mg by mouth every 6 (six) hours as needed for Pain. (Patient not taking: Reported on 3/16/2023), Disp: , Rfl:     Fexofenadine-Pseudoephed -240 MG Oral Tablet 24 Hr, Take 1 tablet by mouth daily. (Patient not taking: Reported on 3/16/2023), Disp: 30 tablet, Rfl: 11    Fexofenadine-Pseudoephed -240 MG Oral Tablet 24 Hr, Take 1 tablet by mouth daily.  (Patient not taking: Reported on 3/16/2023), Disp: 30 tablet, Rfl: 3    Height:  Ht Readings from Last 1 Encounters:  09/22/23 : 5' 4.3\" (1.633 m)    Weight:  Wt Readings from Last 6 Encounters:  09/22/23 : 278 lb  09/22/23 : 276 lb  08/31/23 : 277 lb  08/10/23 : 274 lb  08/08/23 : 275 lb  07/19/23 : 280 lb      Weight change:  down 0.9 lbs since LOV  Post-Op Excess Body Weight Loss: n/a % EBWL  BMI: There is no height or weight on file to calculate BMI. Protein Intake: 24-30 grams/day  Fluid intake:  67 ounces/day    Diet history:       Breakfast      AM Snack       Lunch     PM Snack     Dinner   noodles skip skip Air fried chicken 3-4 oz and string beans and 3 small potatoes Skip and eats cheese crackers 1 package at work               Total Calories:  unsure  Excessive in: nothing  Inadequate in:  protein, fruits, vegetables, and fiber     Patient has made some modifications and adjustments to diet: yes, is practicing eating separately from drinking, has practiced chewing well before swallowing  Food intolerances:  no  Vitamin/mineral supplements:  Iron and Vit D  Protein supplements:  Other none      Activity Level: active  Type: walk  Duration: 30+ minutes per day at work and lifts boxes  Frequency: per day    Other:  Met with pt for pre-op follow-up visit accompanied by mom. Per pt is taking phentermine and feels it helps to curb appetite. Per pt has met with Dr. Melanie Raymond. Per pt is keeping a food record,however, states she has a low battery and is not able to show records. Per pt is unsure of usual grams of protein or carbs consumed  per day or calorie level. Per pt is eating 2-3 times per day. Per diet recall, pt is not meeting protein needs and is eating very little produce. Discussed ways of including protein and produce with meals and snacks. Pt provided with recipe to increase protein with produce. Pt verbalized understanding. Per pt is drinking 67 oz of water per day. Per pt has practiced eating separately from drinking but admits needs more practice doing this. Per pt is active in current job walking over 30 minutes per day and doing strength training routinely by lifting boxes. Reviewed quizzes with pt. Pt scored A+ on all quizzes after reviewing.   Pt with follow up visit next month. Nutrition Diagnosis     Nutrition Diagnosis: Morbid obesity: Improvement shown     Intervention     Recommendations/goals:    1. Keep a food record, My Net Diary, select the macros dashboard. 2.  Add  a meal and snack. Strive to consume at least 4-6 meals/snacks per day. Include protein and produce when you eat. Aim for 55-66 grams of protein per day. Try to keep the carbohydrates at 120 grams per day or less. (Bring hummus and veggies to work, or peanut butter and apples or HB eggs or nuts)  3. Continue to practice eating strategies, eat separately from drinking, avoid straws, chew food 20-30 times before swallowing. Make the meals last 30 minutes. 4.  Continue to drink at least 64 oz per day of water. (Try  Protein water, adding True Lemon, Crystal Light). 5.  Continue to exercise with a goal of 30 minutes per day for exercise (for example,walking). 6.  Continue to strength training 10 minutes 3 days per week. Focus on left arm and legs. 7.  Do the quizzes on pages 18-24 for review at next visit. Monitor/Evaluate     Anthropometric measurements, Food/fluid intake/choices, Food intolerances, Activity level, Vitamin/mineral supplementation, Reinforce goals, and Calorie/protein intake  Additional RD visits required to review concepts? yes  Patient understands protein requirements? yes  Patient understand fluid requirements (amount and method of intake)? yes  Patient understands post-operative diet? yes  Patient keeping consistent food records? Yes, phone was out of battery at visit  Patient ready for Liquid Protein Education?  no      Josefa Newell, RD, LDN  Face-to-face time spent with pt: 60 minutes

## 2023-10-17 ENCOUNTER — DOCUMENTATION ONLY (OUTPATIENT)
Dept: SURGERY | Facility: CLINIC | Age: 43
End: 2023-10-17

## 2023-10-25 ENCOUNTER — OFFICE VISIT (OUTPATIENT)
Dept: SURGERY | Facility: CLINIC | Age: 43
End: 2023-10-25

## 2023-10-25 VITALS — WEIGHT: 274.81 LBS | BODY MASS INDEX: 46.92 KG/M2 | HEIGHT: 64.3 IN

## 2023-10-25 DIAGNOSIS — R12 HEART BURN: ICD-10-CM

## 2023-10-25 DIAGNOSIS — E66.01 MORBID OBESITY WITH BMI OF 45.0-49.9, ADULT (HCC): Primary | ICD-10-CM

## 2023-10-25 PROCEDURE — 3008F BODY MASS INDEX DOCD: CPT

## 2023-10-25 PROCEDURE — 97803 MED NUTRITION INDIV SUBSEQ: CPT

## 2023-10-25 NOTE — PATIENT INSTRUCTIONS
Recommendations/goals:    1. Continue to keep a food record, My Net Diary, select the macros dashboard. 2.  Add  a meal and snack. Strive to consume at least 4-6 meals/snacks per day. Include protein and produce when you eat. Aim for 55-66 grams of protein per day. Try to keep the carbohydrates at 120 grams per day or less. (Bring hummus and veggies to work, or peanut butter and apples or HB eggs or nuts)  3. Continue to practice eating strategies, eat separately from drinking, avoid straws, chew food 20-30 times before swallowing. Make the meals last 30 minutes. 4.  Continue to drink at least 64 oz per day of water. (Try  Protein water, adding True Lemon, Crystal Light). 5.  Continue to exercise with a goal of 30 minutes per day for exercise (for example,walking). 6.  Continue to strength training 10 minutes 3 days per week. Focus on left arm and legs. 7.  Taste test some different protein shakes.

## 2023-11-08 ENCOUNTER — OFFICE VISIT (OUTPATIENT)
Dept: SURGERY | Facility: CLINIC | Age: 43
End: 2023-11-08
Payer: COMMERCIAL

## 2023-11-08 VITALS
WEIGHT: 271 LBS | BODY MASS INDEX: 46.26 KG/M2 | SYSTOLIC BLOOD PRESSURE: 128 MMHG | HEART RATE: 99 BPM | DIASTOLIC BLOOD PRESSURE: 80 MMHG | HEIGHT: 64.3 IN | OXYGEN SATURATION: 100 %

## 2023-11-08 DIAGNOSIS — E66.01 MORBID OBESITY WITH BMI OF 45.0-49.9, ADULT (HCC): Primary | ICD-10-CM

## 2023-11-08 PROBLEM — C91.10 CLL (CHRONIC LYMPHOCYTIC LEUKEMIA) (HCC): Status: ACTIVE | Noted: 2023-11-08

## 2023-11-08 RX ORDER — APREPITANT 40 MG/1
40 CAPSULE ORAL ONCE
Qty: 1 CAPSULE | Refills: 0 | Status: SHIPPED | OUTPATIENT
Start: 2023-11-08 | End: 2023-11-08

## 2023-11-11 ENCOUNTER — APPOINTMENT (OUTPATIENT)
Dept: CT IMAGING | Facility: HOSPITAL | Age: 43
End: 2023-11-11
Attending: STUDENT IN AN ORGANIZED HEALTH CARE EDUCATION/TRAINING PROGRAM
Payer: COMMERCIAL

## 2023-11-11 ENCOUNTER — HOSPITAL ENCOUNTER (EMERGENCY)
Facility: HOSPITAL | Age: 43
Discharge: HOME | End: 2023-11-11
Attending: STUDENT IN AN ORGANIZED HEALTH CARE EDUCATION/TRAINING PROGRAM
Payer: COMMERCIAL

## 2023-11-11 VITALS
BODY MASS INDEX: 42.53 KG/M2 | SYSTOLIC BLOOD PRESSURE: 138 MMHG | WEIGHT: 271 LBS | OXYGEN SATURATION: 99 % | DIASTOLIC BLOOD PRESSURE: 91 MMHG | TEMPERATURE: 98 F | HEART RATE: 91 BPM | HEIGHT: 67 IN | RESPIRATION RATE: 16 BRPM

## 2023-11-11 DIAGNOSIS — R10.9 LEFT FLANK PAIN: Primary | ICD-10-CM

## 2023-11-11 LAB
ALBUMIN SERPL-MCNC: 4.5 G/DL (ref 3.2–4.8)
ALBUMIN/GLOB SERPL: 1.7 {RATIO} (ref 1–2)
ALP LIVER SERPL-CCNC: 55 U/L
ALT SERPL-CCNC: 7 U/L
ANION GAP SERPL CALC-SCNC: 8 MMOL/L (ref 0–18)
AST SERPL-CCNC: 12 U/L (ref ?–34)
B-HCG UR QL: NEGATIVE
BASOPHILS # BLD AUTO: 0.05 X10(3) UL (ref 0–0.2)
BASOPHILS NFR BLD AUTO: 0.3 %
BILIRUB SERPL-MCNC: 0.4 MG/DL (ref 0.3–1.2)
BILIRUB UR QL: NEGATIVE
BUN BLD-MCNC: 12 MG/DL (ref 9–23)
BUN/CREAT SERPL: 13.8 (ref 10–20)
CALCIUM BLD-MCNC: 9.8 MG/DL (ref 8.7–10.4)
CHLORIDE SERPL-SCNC: 102 MMOL/L (ref 98–112)
CLARITY UR: CLEAR
CO2 SERPL-SCNC: 26 MMOL/L (ref 21–32)
COLOR UR: COLORLESS
CREAT BLD-MCNC: 0.87 MG/DL
DEPRECATED RDW RBC AUTO: 42.7 FL (ref 35.1–46.3)
EGFRCR SERPLBLD CKD-EPI 2021: 86 ML/MIN/1.73M2 (ref 60–?)
EOSINOPHIL # BLD AUTO: 0.23 X10(3) UL (ref 0–0.7)
EOSINOPHIL NFR BLD AUTO: 1.3 %
ERYTHROCYTE [DISTWIDTH] IN BLOOD BY AUTOMATED COUNT: 15.7 % (ref 11–15)
GLOBULIN PLAS-MCNC: 2.7 G/DL (ref 2.8–4.4)
GLUCOSE BLD-MCNC: 95 MG/DL (ref 70–99)
GLUCOSE UR-MCNC: NORMAL MG/DL
HCT VFR BLD AUTO: 39.7 %
HGB BLD-MCNC: 12 G/DL
HGB UR QL STRIP.AUTO: NEGATIVE
IMM GRANULOCYTES # BLD AUTO: 0.04 X10(3) UL (ref 0–1)
IMM GRANULOCYTES NFR BLD: 0.2 %
KETONES UR-MCNC: NEGATIVE MG/DL
LEUKOCYTE ESTERASE UR QL STRIP.AUTO: NEGATIVE
LIPASE SERPL-CCNC: 32 U/L (ref 13–75)
LYMPHOCYTES # BLD AUTO: 11.5 X10(3) UL (ref 1–4)
LYMPHOCYTES NFR BLD AUTO: 64.4 %
MCH RBC QN AUTO: 22.9 PG (ref 26–34)
MCHC RBC AUTO-ENTMCNC: 30.2 G/DL (ref 31–37)
MCV RBC AUTO: 75.8 FL
MONOCYTES # BLD AUTO: 0.59 X10(3) UL (ref 0.1–1)
MONOCYTES NFR BLD AUTO: 3.3 %
NEUTROPHILS # BLD AUTO: 5.45 X10 (3) UL (ref 1.5–7.7)
NEUTROPHILS # BLD AUTO: 5.45 X10(3) UL (ref 1.5–7.7)
NEUTROPHILS NFR BLD AUTO: 30.5 %
NITRITE UR QL STRIP.AUTO: NEGATIVE
OSMOLALITY SERPL CALC.SUM OF ELEC: 282 MOSM/KG (ref 275–295)
PH UR: 6 [PH] (ref 5–8)
PLATELET # BLD AUTO: 430 10(3)UL (ref 150–450)
POTASSIUM SERPL-SCNC: 4 MMOL/L (ref 3.5–5.1)
PROT SERPL-MCNC: 7.2 G/DL (ref 5.7–8.2)
PROT UR-MCNC: NEGATIVE MG/DL
RBC # BLD AUTO: 5.24 X10(6)UL
SODIUM SERPL-SCNC: 136 MMOL/L (ref 136–145)
SP GR UR STRIP: 1.01 (ref 1–1.03)
UROBILINOGEN UR STRIP-ACNC: NORMAL
WBC # BLD AUTO: 17.9 X10(3) UL (ref 4–11)

## 2023-11-11 PROCEDURE — 85025 COMPLETE CBC W/AUTO DIFF WBC: CPT | Performed by: STUDENT IN AN ORGANIZED HEALTH CARE EDUCATION/TRAINING PROGRAM

## 2023-11-11 PROCEDURE — 80053 COMPREHEN METABOLIC PANEL: CPT | Performed by: STUDENT IN AN ORGANIZED HEALTH CARE EDUCATION/TRAINING PROGRAM

## 2023-11-11 PROCEDURE — 83690 ASSAY OF LIPASE: CPT

## 2023-11-11 PROCEDURE — 96361 HYDRATE IV INFUSION ADD-ON: CPT

## 2023-11-11 PROCEDURE — 96374 THER/PROPH/DIAG INJ IV PUSH: CPT

## 2023-11-11 PROCEDURE — 99284 EMERGENCY DEPT VISIT MOD MDM: CPT

## 2023-11-11 PROCEDURE — 81003 URINALYSIS AUTO W/O SCOPE: CPT

## 2023-11-11 PROCEDURE — 83690 ASSAY OF LIPASE: CPT | Performed by: STUDENT IN AN ORGANIZED HEALTH CARE EDUCATION/TRAINING PROGRAM

## 2023-11-11 PROCEDURE — 74176 CT ABD & PELVIS W/O CONTRAST: CPT | Performed by: STUDENT IN AN ORGANIZED HEALTH CARE EDUCATION/TRAINING PROGRAM

## 2023-11-11 PROCEDURE — 85025 COMPLETE CBC W/AUTO DIFF WBC: CPT

## 2023-11-11 PROCEDURE — 99285 EMERGENCY DEPT VISIT HI MDM: CPT

## 2023-11-11 PROCEDURE — 81003 URINALYSIS AUTO W/O SCOPE: CPT | Performed by: STUDENT IN AN ORGANIZED HEALTH CARE EDUCATION/TRAINING PROGRAM

## 2023-11-11 PROCEDURE — 81025 URINE PREGNANCY TEST: CPT

## 2023-11-11 RX ORDER — LIDOCAINE 50 MG/G
1 PATCH TOPICAL EVERY 24 HOURS
Qty: 7 PATCH | Refills: 0 | Status: SHIPPED | OUTPATIENT
Start: 2023-11-11 | End: 2023-11-18

## 2023-11-11 RX ORDER — KETOROLAC TROMETHAMINE 15 MG/ML
15 INJECTION, SOLUTION INTRAMUSCULAR; INTRAVENOUS ONCE
Status: COMPLETED | OUTPATIENT
Start: 2023-11-11 | End: 2023-11-11

## 2023-11-11 RX ORDER — NAPROXEN 500 MG/1
500 TABLET ORAL 2 TIMES DAILY PRN
Qty: 10 TABLET | Refills: 0 | Status: SHIPPED | OUTPATIENT
Start: 2023-11-11 | End: 2023-11-16

## 2023-11-11 NOTE — ED INITIAL ASSESSMENT (HPI)
Pt with history of kidney stones came in for left flank pain radiating to the left abdomen x 1 week. Denies N/V/D. Pt is A/Ox  4, breathing unlabored.

## 2023-11-16 ENCOUNTER — OFFICE VISIT (OUTPATIENT)
Dept: SURGERY | Facility: CLINIC | Age: 43
End: 2023-11-16
Payer: COMMERCIAL

## 2023-11-16 VITALS — BODY MASS INDEX: 46.83 KG/M2 | HEIGHT: 64.3 IN | WEIGHT: 274.31 LBS

## 2023-11-16 DIAGNOSIS — E66.01 MORBID OBESITY WITH BMI OF 45.0-49.9, ADULT (HCC): Primary | ICD-10-CM

## 2023-11-16 PROCEDURE — 97803 MED NUTRITION INDIV SUBSEQ: CPT | Performed by: DIETITIAN, REGISTERED

## 2023-11-16 PROCEDURE — 3008F BODY MASS INDEX DOCD: CPT | Performed by: DIETITIAN, REGISTERED

## 2023-11-27 ENCOUNTER — VIRTUAL PHONE E/M (OUTPATIENT)
Dept: SURGERY | Facility: CLINIC | Age: 43
End: 2023-11-27
Payer: COMMERCIAL

## 2023-11-27 VITALS — WEIGHT: 272 LBS | HEIGHT: 64.3 IN | BODY MASS INDEX: 46.44 KG/M2

## 2023-11-27 DIAGNOSIS — Z51.81 ENCOUNTER FOR THERAPEUTIC DRUG MONITORING: ICD-10-CM

## 2023-11-27 DIAGNOSIS — R12 HEART BURN: Primary | ICD-10-CM

## 2023-11-27 DIAGNOSIS — E66.01 MORBID OBESITY WITH BMI OF 45.0-49.9, ADULT (HCC): ICD-10-CM

## 2023-11-27 NOTE — PATIENT INSTRUCTIONS
Outpatient Encounter Medications as of 11/27/2023   Medication Sig Note    [DISCONTINUED] Phentermine HCl 37.5 MG Oral Tab Take 1 tablet (37.5 mg total) by mouth every morning before breakfast.     [DISCONTINUED] Hydroquinone 4 % External Cream APPLY ONCE TO TWICE DAILY TO HYPERPIGMENTED SKIN ONLY (Patient not taking: Reported on 8/10/2023)     [DISCONTINUED] Clindamycin Phosphate 1 % External Gel Apply 1 Application topically every morning. (Patient not taking: Reported on 8/10/2023)     [DISCONTINUED] doxycycline 100 MG Oral Cap Take 1 capsule (100 mg total) by mouth 2 (two) times daily. (Patient not taking: Reported on 8/10/2023)     [DISCONTINUED] Hydroquinone 4 % External Cream APPLY ONCE TO TWICE DAILY TO HYPERPIGMENTED SKIN ONLY (Patient not taking: Reported on 8/10/2023)     [DISCONTINUED] tretinoin 0.025 % External Cream APPLY A PEA SIZED AMOUNT TO THE AFFECTED AREA OF FACE AT BEDTIME (Patient not taking: Reported on 8/10/2023)     [DISCONTINUED] Tretinoin 0.05 % External Cream  (Patient not taking: Reported on 8/10/2023)     [DISCONTINUED] ibuprofen 200 MG Oral Tab Take 200 mg by mouth every 6 (six) hours as needed for Pain. (Patient not taking: Reported on 3/16/2023)     [DISCONTINUED] Fexofenadine-Pseudoephed -240 MG Oral Tablet 24 Hr Take 1 tablet by mouth daily. (Patient not taking: Reported on 3/99/7998) 0/43/4437: duplicate    [DISCONTINUED] Fexofenadine-Pseudoephed -240 MG Oral Tablet 24 Hr Take 1 tablet by mouth daily. (Patient not taking: Reported on 3/16/2023)      No facility-administered encounter medications on file as of 11/27/2023.

## 2023-12-06 ENCOUNTER — LAB ENCOUNTER (OUTPATIENT)
Dept: LAB | Facility: HOSPITAL | Age: 43
End: 2023-12-06
Attending: SINGLE SPECIALTY
Payer: COMMERCIAL

## 2023-12-06 DIAGNOSIS — Z01.818 PREOP TESTING: ICD-10-CM

## 2023-12-06 LAB
ANTIBODY SCREEN: NEGATIVE
RH BLOOD TYPE: POSITIVE

## 2023-12-06 PROCEDURE — 86850 RBC ANTIBODY SCREEN: CPT

## 2023-12-06 PROCEDURE — 36415 COLL VENOUS BLD VENIPUNCTURE: CPT

## 2023-12-06 PROCEDURE — 86900 BLOOD TYPING SEROLOGIC ABO: CPT

## 2023-12-06 PROCEDURE — 86901 BLOOD TYPING SEROLOGIC RH(D): CPT

## 2023-12-08 RX ORDER — CLINDAMYCIN PHOSPHATE 900 MG/50ML
900 INJECTION, SOLUTION INTRAVENOUS ONCE
Status: CANCELLED | OUTPATIENT
Start: 2023-12-08 | End: 2023-12-08

## 2023-12-11 ENCOUNTER — ANESTHESIA (OUTPATIENT)
Dept: SURGERY | Facility: HOSPITAL | Age: 43
End: 2023-12-11
Payer: COMMERCIAL

## 2023-12-11 ENCOUNTER — ANESTHESIA EVENT (OUTPATIENT)
Dept: SURGERY | Facility: HOSPITAL | Age: 43
End: 2023-12-11
Payer: COMMERCIAL

## 2023-12-11 ENCOUNTER — HOSPITAL ENCOUNTER (INPATIENT)
Facility: HOSPITAL | Age: 43
LOS: 1 days | Discharge: HOME | End: 2023-12-12
Attending: SINGLE SPECIALTY | Admitting: SINGLE SPECIALTY
Payer: COMMERCIAL

## 2023-12-11 DIAGNOSIS — Z01.818 PREOP TESTING: Primary | ICD-10-CM

## 2023-12-11 PROBLEM — E66.01 MORBID (SEVERE) OBESITY DUE TO EXCESS CALORIES (HCC): Status: ACTIVE | Noted: 2023-12-11

## 2023-12-11 LAB — B-HCG UR QL: NEGATIVE

## 2023-12-11 PROCEDURE — 0DB64Z3 EXCISION OF STOMACH, PERCUTANEOUS ENDOSCOPIC APPROACH, VERTICAL: ICD-10-PCS | Performed by: SINGLE SPECIALTY

## 2023-12-11 PROCEDURE — 3E0T3BZ INTRODUCTION OF ANESTHETIC AGENT INTO PERIPHERAL NERVES AND PLEXI, PERCUTANEOUS APPROACH: ICD-10-PCS | Performed by: SINGLE SPECIALTY

## 2023-12-11 PROCEDURE — 99222 1ST HOSP IP/OBS MODERATE 55: CPT | Performed by: INTERNAL MEDICINE

## 2023-12-11 DEVICE — SEAM GUARD 60 ECHELON: Type: IMPLANTABLE DEVICE | Site: STOMACH | Status: FUNCTIONAL

## 2023-12-11 RX ORDER — KETOROLAC TROMETHAMINE 30 MG/ML
30 INJECTION, SOLUTION INTRAMUSCULAR; INTRAVENOUS EVERY 6 HOURS
Status: DISCONTINUED | OUTPATIENT
Start: 2023-12-11 | End: 2023-12-12

## 2023-12-11 RX ORDER — GABAPENTIN 300 MG/1
300 CAPSULE ORAL ONCE
Status: COMPLETED | OUTPATIENT
Start: 2023-12-11 | End: 2023-12-11

## 2023-12-11 RX ORDER — MORPHINE SULFATE 4 MG/ML
2 INJECTION, SOLUTION INTRAMUSCULAR; INTRAVENOUS EVERY 10 MIN PRN
Status: DISCONTINUED | OUTPATIENT
Start: 2023-12-11 | End: 2023-12-11 | Stop reason: HOSPADM

## 2023-12-11 RX ORDER — ACETAMINOPHEN 500 MG
1000 TABLET ORAL ONCE
Status: COMPLETED | OUTPATIENT
Start: 2023-12-11 | End: 2023-12-11

## 2023-12-11 RX ORDER — SODIUM CHLORIDE, SODIUM LACTATE, POTASSIUM CHLORIDE, CALCIUM CHLORIDE 600; 310; 30; 20 MG/100ML; MG/100ML; MG/100ML; MG/100ML
INJECTION, SOLUTION INTRAVENOUS CONTINUOUS
Status: DISCONTINUED | OUTPATIENT
Start: 2023-12-11 | End: 2023-12-12

## 2023-12-11 RX ORDER — HYDROCODONE BITARTRATE AND ACETAMINOPHEN 5; 325 MG/1; MG/1
1 TABLET ORAL ONCE AS NEEDED
Status: DISCONTINUED | OUTPATIENT
Start: 2023-12-11 | End: 2023-12-11 | Stop reason: HOSPADM

## 2023-12-11 RX ORDER — OXYCODONE HYDROCHLORIDE 5 MG/1
5 TABLET ORAL EVERY 6 HOURS PRN
Status: DISCONTINUED | OUTPATIENT
Start: 2023-12-11 | End: 2023-12-12

## 2023-12-11 RX ORDER — NALOXONE HYDROCHLORIDE 0.4 MG/ML
80 INJECTION, SOLUTION INTRAMUSCULAR; INTRAVENOUS; SUBCUTANEOUS AS NEEDED
Status: DISCONTINUED | OUTPATIENT
Start: 2023-12-11 | End: 2023-12-11 | Stop reason: HOSPADM

## 2023-12-11 RX ORDER — HYDROMORPHONE HYDROCHLORIDE 1 MG/ML
0.6 INJECTION, SOLUTION INTRAMUSCULAR; INTRAVENOUS; SUBCUTANEOUS EVERY 5 MIN PRN
Status: DISCONTINUED | OUTPATIENT
Start: 2023-12-11 | End: 2023-12-11 | Stop reason: HOSPADM

## 2023-12-11 RX ORDER — ONDANSETRON 2 MG/ML
4 INJECTION INTRAMUSCULAR; INTRAVENOUS EVERY 6 HOURS PRN
Status: DISCONTINUED | OUTPATIENT
Start: 2023-12-11 | End: 2023-12-11 | Stop reason: HOSPADM

## 2023-12-11 RX ORDER — LIDOCAINE HYDROCHLORIDE 10 MG/ML
INJECTION, SOLUTION EPIDURAL; INFILTRATION; INTRACAUDAL; PERINEURAL AS NEEDED
Status: DISCONTINUED | OUTPATIENT
Start: 2023-12-11 | End: 2023-12-11 | Stop reason: SURG

## 2023-12-11 RX ORDER — LIDOCAINE HYDROCHLORIDE 40 MG/ML
SOLUTION TOPICAL AS NEEDED
Status: DISCONTINUED | OUTPATIENT
Start: 2023-12-11 | End: 2023-12-11 | Stop reason: SURG

## 2023-12-11 RX ORDER — FAMOTIDINE 20 MG/1
20 TABLET, FILM COATED ORAL ONCE
Status: COMPLETED | OUTPATIENT
Start: 2023-12-11 | End: 2023-12-11

## 2023-12-11 RX ORDER — HYDROCODONE BITARTRATE AND ACETAMINOPHEN 5; 325 MG/1; MG/1
2 TABLET ORAL ONCE AS NEEDED
Status: DISCONTINUED | OUTPATIENT
Start: 2023-12-11 | End: 2023-12-11 | Stop reason: HOSPADM

## 2023-12-11 RX ORDER — MORPHINE SULFATE 2 MG/ML
1 INJECTION, SOLUTION INTRAMUSCULAR; INTRAVENOUS EVERY 2 HOUR PRN
Status: DISCONTINUED | OUTPATIENT
Start: 2023-12-11 | End: 2023-12-12

## 2023-12-11 RX ORDER — LABETALOL HYDROCHLORIDE 5 MG/ML
5 INJECTION, SOLUTION INTRAVENOUS EVERY 10 MIN PRN
Status: DISCONTINUED | OUTPATIENT
Start: 2023-12-11 | End: 2023-12-11 | Stop reason: HOSPADM

## 2023-12-11 RX ORDER — ROCURONIUM BROMIDE 10 MG/ML
INJECTION, SOLUTION INTRAVENOUS AS NEEDED
Status: DISCONTINUED | OUTPATIENT
Start: 2023-12-11 | End: 2023-12-11 | Stop reason: SURG

## 2023-12-11 RX ORDER — MORPHINE SULFATE 10 MG/ML
6 INJECTION, SOLUTION INTRAMUSCULAR; INTRAVENOUS EVERY 10 MIN PRN
Status: DISCONTINUED | OUTPATIENT
Start: 2023-12-11 | End: 2023-12-11 | Stop reason: HOSPADM

## 2023-12-11 RX ORDER — CEFAZOLIN SODIUM IN 0.9 % NACL 3 G/100 ML
3 INTRAVENOUS SOLUTION, PIGGYBACK (ML) INTRAVENOUS ONCE
Status: COMPLETED | OUTPATIENT
Start: 2023-12-11 | End: 2023-12-11

## 2023-12-11 RX ORDER — ONDANSETRON 2 MG/ML
INJECTION INTRAMUSCULAR; INTRAVENOUS AS NEEDED
Status: DISCONTINUED | OUTPATIENT
Start: 2023-12-11 | End: 2023-12-11 | Stop reason: SURG

## 2023-12-11 RX ORDER — HEPARIN SODIUM 5000 [USP'U]/ML
5000 INJECTION, SOLUTION INTRAVENOUS; SUBCUTANEOUS ONCE
Status: COMPLETED | OUTPATIENT
Start: 2023-12-11 | End: 2023-12-11

## 2023-12-11 RX ORDER — ACETAMINOPHEN 160 MG/5ML
1000 SOLUTION ORAL EVERY 8 HOURS
Status: DISCONTINUED | OUTPATIENT
Start: 2023-12-11 | End: 2023-12-12

## 2023-12-11 RX ORDER — DEXAMETHASONE SODIUM PHOSPHATE 4 MG/ML
VIAL (ML) INJECTION AS NEEDED
Status: DISCONTINUED | OUTPATIENT
Start: 2023-12-11 | End: 2023-12-11 | Stop reason: SURG

## 2023-12-11 RX ORDER — CELECOXIB 200 MG/1
400 CAPSULE ORAL ONCE
Status: COMPLETED | OUTPATIENT
Start: 2023-12-11 | End: 2023-12-11

## 2023-12-11 RX ORDER — METOCLOPRAMIDE HYDROCHLORIDE 5 MG/ML
10 INJECTION INTRAMUSCULAR; INTRAVENOUS ONCE
Status: COMPLETED | OUTPATIENT
Start: 2023-12-11 | End: 2023-12-11

## 2023-12-11 RX ORDER — GLYCOPYRROLATE 0.2 MG/ML
INJECTION, SOLUTION INTRAMUSCULAR; INTRAVENOUS AS NEEDED
Status: DISCONTINUED | OUTPATIENT
Start: 2023-12-11 | End: 2023-12-11 | Stop reason: SURG

## 2023-12-11 RX ORDER — MORPHINE SULFATE 4 MG/ML
4 INJECTION, SOLUTION INTRAMUSCULAR; INTRAVENOUS EVERY 10 MIN PRN
Status: DISCONTINUED | OUTPATIENT
Start: 2023-12-11 | End: 2023-12-11 | Stop reason: HOSPADM

## 2023-12-11 RX ORDER — METOCLOPRAMIDE 10 MG/1
10 TABLET ORAL ONCE
Status: COMPLETED | OUTPATIENT
Start: 2023-12-11 | End: 2023-12-11

## 2023-12-11 RX ORDER — HYDROMORPHONE HYDROCHLORIDE 1 MG/ML
0.4 INJECTION, SOLUTION INTRAMUSCULAR; INTRAVENOUS; SUBCUTANEOUS EVERY 5 MIN PRN
Status: DISCONTINUED | OUTPATIENT
Start: 2023-12-11 | End: 2023-12-11 | Stop reason: HOSPADM

## 2023-12-11 RX ORDER — FAMOTIDINE 10 MG/ML
20 INJECTION, SOLUTION INTRAVENOUS ONCE
Status: COMPLETED | OUTPATIENT
Start: 2023-12-11 | End: 2023-12-11

## 2023-12-11 RX ORDER — ONDANSETRON 2 MG/ML
4 INJECTION INTRAMUSCULAR; INTRAVENOUS EVERY 6 HOURS PRN
Status: DISCONTINUED | OUTPATIENT
Start: 2023-12-11 | End: 2023-12-12

## 2023-12-11 RX ORDER — HYDROMORPHONE HYDROCHLORIDE 1 MG/ML
0.2 INJECTION, SOLUTION INTRAMUSCULAR; INTRAVENOUS; SUBCUTANEOUS EVERY 5 MIN PRN
Status: DISCONTINUED | OUTPATIENT
Start: 2023-12-11 | End: 2023-12-11 | Stop reason: HOSPADM

## 2023-12-11 RX ORDER — ENOXAPARIN SODIUM 100 MG/ML
40 INJECTION SUBCUTANEOUS DAILY
Status: DISCONTINUED | OUTPATIENT
Start: 2023-12-12 | End: 2023-12-12

## 2023-12-11 RX ORDER — PANTOPRAZOLE SODIUM 40 MG/1
40 TABLET, DELAYED RELEASE ORAL
Status: DISCONTINUED | OUTPATIENT
Start: 2023-12-12 | End: 2023-12-12

## 2023-12-11 RX ORDER — MORPHINE SULFATE 4 MG/ML
4 INJECTION, SOLUTION INTRAMUSCULAR; INTRAVENOUS EVERY 2 HOUR PRN
Status: DISCONTINUED | OUTPATIENT
Start: 2023-12-11 | End: 2023-12-12

## 2023-12-11 RX ORDER — SODIUM CHLORIDE, SODIUM LACTATE, POTASSIUM CHLORIDE, CALCIUM CHLORIDE 600; 310; 30; 20 MG/100ML; MG/100ML; MG/100ML; MG/100ML
INJECTION, SOLUTION INTRAVENOUS CONTINUOUS
Status: DISCONTINUED | OUTPATIENT
Start: 2023-12-11 | End: 2023-12-11 | Stop reason: HOSPADM

## 2023-12-11 RX ORDER — DIPHENHYDRAMINE HYDROCHLORIDE 50 MG/ML
INJECTION INTRAMUSCULAR; INTRAVENOUS AS NEEDED
Status: DISCONTINUED | OUTPATIENT
Start: 2023-12-11 | End: 2023-12-11 | Stop reason: SURG

## 2023-12-11 RX ORDER — MORPHINE SULFATE 2 MG/ML
2 INJECTION, SOLUTION INTRAMUSCULAR; INTRAVENOUS EVERY 2 HOUR PRN
Status: DISCONTINUED | OUTPATIENT
Start: 2023-12-11 | End: 2023-12-12

## 2023-12-11 RX ORDER — ACETAMINOPHEN 500 MG
1000 TABLET ORAL ONCE AS NEEDED
Status: DISCONTINUED | OUTPATIENT
Start: 2023-12-11 | End: 2023-12-11 | Stop reason: HOSPADM

## 2023-12-11 RX ORDER — BUPIVACAINE HYDROCHLORIDE AND EPINEPHRINE 2.5; 5 MG/ML; UG/ML
INJECTION, SOLUTION INFILTRATION; PERINEURAL AS NEEDED
Status: DISCONTINUED | OUTPATIENT
Start: 2023-12-11 | End: 2023-12-11 | Stop reason: HOSPADM

## 2023-12-11 RX ORDER — PROCHLORPERAZINE EDISYLATE 5 MG/ML
5 INJECTION INTRAMUSCULAR; INTRAVENOUS EVERY 8 HOURS PRN
Status: DISCONTINUED | OUTPATIENT
Start: 2023-12-11 | End: 2023-12-11 | Stop reason: HOSPADM

## 2023-12-11 RX ADMIN — ONDANSETRON 4 MG: 2 INJECTION INTRAMUSCULAR; INTRAVENOUS at 10:07:00

## 2023-12-11 RX ADMIN — GLYCOPYRROLATE 0.2 MG: 0.2 INJECTION, SOLUTION INTRAMUSCULAR; INTRAVENOUS at 09:12:00

## 2023-12-11 RX ADMIN — LIDOCAINE HYDROCHLORIDE 4 ML: 40 SOLUTION TOPICAL at 09:08:00

## 2023-12-11 RX ADMIN — LIDOCAINE HYDROCHLORIDE 50 MG: 10 INJECTION, SOLUTION EPIDURAL; INFILTRATION; INTRACAUDAL; PERINEURAL at 09:06:00

## 2023-12-11 RX ADMIN — DIPHENHYDRAMINE HYDROCHLORIDE 12.5 MG: 50 INJECTION INTRAMUSCULAR; INTRAVENOUS at 09:12:00

## 2023-12-11 RX ADMIN — DEXAMETHASONE SODIUM PHOSPHATE 8 MG: 4 MG/ML VIAL (ML) INJECTION at 09:12:00

## 2023-12-11 RX ADMIN — ROCURONIUM BROMIDE 80 MG: 10 INJECTION, SOLUTION INTRAVENOUS at 09:06:00

## 2023-12-11 RX ADMIN — SODIUM CHLORIDE, SODIUM LACTATE, POTASSIUM CHLORIDE, CALCIUM CHLORIDE: 600; 310; 30; 20 INJECTION, SOLUTION INTRAVENOUS at 10:09:00

## 2023-12-11 RX ADMIN — CEFAZOLIN SODIUM IN 0.9 % NACL 3 G: 3 G/100 ML INTRAVENOUS SOLUTION, PIGGYBACK (ML) INTRAVENOUS at 09:12:00

## 2023-12-11 NOTE — PLAN OF CARE
Akin Wlil is Post op today S/P lap bariatric sleeve. She is alert and ox4. 5 lap sites to abdomen w dermabond AGNES- ice packs to site. No drainage noted- no s/s infection. IV fluids maintained at 150 ml/hr. Pain managed w scheduled toradol and tylenol. PRN Norco available. Educated on bariatric diet and using medicine cups to measure out 1 oz liquids- sheets to martin ounces given and verb good understanding. SCD;s in place- incentive spirometry. Ambulated and walked to bathroom- voiding well. Family at bedside- ambulated x 4 in halls- up in chair for meals. Voided post op. BRP w SBA- c/o gas pains- encouraged ambulation  Problem: PAIN - ADULT  Goal: Verbalizes/displays adequate comfort level or patient's stated pain goal  Description: INTERVENTIONS:  - Encourage pt to monitor pain and request assistance  - Assess pain using appropriate pain scale  - Administer analgesics based on type and severity of pain and evaluate response  - Implement non-pharmacological measures as appropriate and evaluate response  - Consider cultural and social influences on pain and pain management  - Manage/alleviate anxiety  - Utilize distraction and/or relaxation techniques  - Monitor for opioid side effects  - Notify MD/LIP if interventions unsuccessful or patient reports new pain  - Anticipate increased pain with activity and pre-medicate as appropriate  Outcome: Progressing     Problem: RISK FOR INFECTION - ADULT  Goal: Absence of fever/infection during anticipated neutropenic period  Description: INTERVENTIONS  - Monitor WBC  - Administer growth factors as ordered  - Implement neutropenic guidelines  Outcome: Progressing     Problem: SAFETY ADULT - FALL  Goal: Free from fall injury  Description: INTERVENTIONS:  - Assess pt frequently for physical needs  - Identify cognitive and physical deficits and behaviors that affect risk of falls.   - [place] fall precautions as indicated by assessment.  - Educate pt/family on patient safety including physical limitations  - Instruct pt to call for assistance with activity based on assessment  - Modify environment to reduce risk of injury  - Provide assistive devices as appropriate  - Consider OT/PT consult to assist with strengthening/mobility  - Encourage toileting schedule  Outcome: Progressing     Problem: DISCHARGE PLANNING  Goal: Discharge to home or other facility with appropriate resources  Description: INTERVENTIONS:  - Identify barriers to discharge w/pt and caregiver  - Include patient/family/discharge partner in discharge planning  - Arrange for needed discharge resources and transportation as appropriate  - Identify discharge learning needs (meds, wound care, etc)  - Arrange for interpreters to assist at discharge as needed  - Consider post-discharge preferences of patient/family/discharge partner  - Complete POLST form as appropriate  - Assess patient's ability to be responsible for managing their own health  - Refer to Case Management Department for coordinating discharge planning if the patient needs post-hospital services based on physician/LIP order or complex needs related to functional status, cognitive ability or social support system  Outcome: Progressing

## 2023-12-11 NOTE — ANESTHESIA PROCEDURE NOTES
Airway  Date/Time: 12/11/2023 9:08 AM  Urgency: Elective    Airway not difficult    General Information and Staff    Patient location during procedure: OR  Anesthesiologist: Merlin Vieira MD  Performed: anesthesiologist   Performed by: Merlin Vieira MD  Authorized by: Merlin Vieira MD      Indications and Patient Condition  Indications for airway management: anesthesia  Sedation level: deep  Preoxygenated: yes  Patient position: sniffing  Mask difficulty assessment: 1 - vent by mask    Final Airway Details  Final airway type: endotracheal airway      Successful airway: ETT  Cuffed: yes   Successful intubation technique: direct laryngoscopy  Endotracheal tube insertion site: oral  Blade: Kristopher  Blade size: #3  ETT size (mm): 7.0    Cormack-Lehane Classification: grade I - full view of glottis  Placement verified by: capnometry   Measured from: teeth  ETT to teeth (cm): 21  Number of attempts at approach: 1

## 2023-12-11 NOTE — OPERATIVE REPORT
Riddhi Samuels / Norma Amezquita / Millie Tejeda / Willam Dietrich / Kojo Alves / Kemi Stevenson - [phone]  Answering Service [phone]        Date: 12/11/2023  Preop Diagnosis: Morbid Obesity secondary to excess calories   Postop Diagnosis: Morbid Obesity secondary to excess calories  Procedure: Laparoscopic sleeve gastrectomy  Surgeon: Kaia Mohamud  Assistant: Willam Dietrich  Anesthesia: GETA  EBL: 5 ml  Complications: None    Indications: Pt is a 41year old female who presents for elective bariatric surgery. she has been counseled regarding the treatment of severe obesity, the options of behavioral, medical, and surgical treatment plans, and the potential pros and cons of each. she understands the multidisciplinary approach to the treatment of obesity, and the need for long-term followup. Through a lengthy preoperative counseling process, she has elected for the sleeve gastrectomy as her procedure of choice. she understands the different options for bariatric surgery, the laparoscopic approach, the possibility conversion to open, the anticipated pain and recovery time, the anticipated weight loss, the pros and cons of each operation, and the risks of bariatric surgery associated, including but not limited to universal risks such as bleeding, infection, DVT, PE, cardiac and pulmonary complications, and the small risk of suffering a fatal complication/death, as well as risks specific to the staple line leak, and understands the risk of postoperative reflux. I discussed the possibility of encountering a hiatal hernia during the procedure in which case we may fix it in addition to the sleeve gastrectomy. she also understands the potential long term sequelae and complications such as marginal ulcer, stricture, postoperative reflux. she also understands the need for lifelong nutritional supplementation, and long-term follow-up, and agrees to proceed.     Operative Summary: Patient was brought to the operating room and placed under general anesthesia. Preoperative antibiotics and heparin were given and was secured to the operating room table. Prepped and draped in a sterile fashion. An Optiview technique was used to insert an 11 mm left paramedian trocar and the abdomen was insufflated and the patient tolerated the insufflation well throughout the entire case. Cursory examination of the abdomen was unremarkable on insertion of the laparoscope. The patient was then placed in steep reverse Trendelenburg position. A 5 mm left lateral trocar, a 5 mm right upper quadrant trocar, and a 12 mm right paramedian trocar were all inserted under direct vision. A Codie liver retractor was inserted through an epigastric port. 0.25% Marcaine was injected for local anesthesia during trocar placement as well as a bilateral TAP block. We began by mobilizing the greater curvature of the stomach using an Enseal device. We took this dissection all the way up along the greater curve and mobilize the entire fundus. The angle of His was carefully dissected out identifying the anterior border of the left scott. The posterior fundus was carefully mobilized off of the retroperitoneum. There was no hiatal hernia noted. Once the fundus was completely mobilized we continued our dissection distally along the greater curvature towards the antrum. We stopped the dissection at the starting point of our sleeve gastrectomy, which was about 6 cm proximal to the pylorus. We then advanced a 40 [place] Visi G bougie that was inserted by anesthesia and guided along the lesser curve towards the antrum. We then performed our sleeve gastrectomy starting with sequential fires of the [place] stapler with Seamguard reinforcements. Along our sleeve, we ensured that we did not narrow the sleeve at the incisura and that we did not cause any twisting or corkscrewing of the sleeve along its path.   We completed our sleeve with a total of 2 black loads and 2 green loads, taking care to ensure that we did not leave a significant posterior fundic pouch. The final seam guard was cut sharply, completing the sleeve gastrectomy. We ensured that there was excellent hemostasis along all dissection lines. The Visi G was pulled back a few centimeters and the antrum was occluded with atraumatic grasper. A leak test was performed by insufflating the VISI G under irrigated water. There was good distention of the sleeve. There is no evidence of any leak. There is no evidence of any bleeding along the staple lines. The irrigation was suctioned out. The [facility] liver retractor was removed under direct vision. The patient was flattened out. The sleeve gastrectomy was extracted from the right paramedian trocar site. The fascia at the extraction site was closed with 0 PDS suture passed with a Doree Randy. Trocars were removed under direct vision. The skin of all incisions was closed with 4-0 Vicryl and Dermabond. Patient tolerated the procedure well and was brought to the postanesthesia recovery unit in stable condition.

## 2023-12-12 VITALS
BODY MASS INDEX: 44.9 KG/M2 | SYSTOLIC BLOOD PRESSURE: 157 MMHG | OXYGEN SATURATION: 100 % | TEMPERATURE: 98 F | HEART RATE: 77 BPM | DIASTOLIC BLOOD PRESSURE: 100 MMHG | HEIGHT: 64 IN | WEIGHT: 263 LBS | RESPIRATION RATE: 20 BRPM

## 2023-12-12 LAB
ANION GAP SERPL CALC-SCNC: 4 MMOL/L (ref 0–18)
BASOPHILS # BLD AUTO: 0.03 X10(3) UL (ref 0–0.2)
BASOPHILS NFR BLD AUTO: 0.2 %
BUN BLD-MCNC: <5 MG/DL (ref 9–23)
CALCIUM BLD-MCNC: 9.1 MG/DL (ref 8.7–10.4)
CHLORIDE SERPL-SCNC: 105 MMOL/L (ref 98–112)
CO2 SERPL-SCNC: 27 MMOL/L (ref 21–32)
CREAT BLD-MCNC: 0.65 MG/DL
DEPRECATED RDW RBC AUTO: 41.7 FL (ref 35.1–46.3)
EGFRCR SERPLBLD CKD-EPI 2021: 113 ML/MIN/1.73M2 (ref 60–?)
EOSINOPHIL # BLD AUTO: 0.01 X10(3) UL (ref 0–0.7)
EOSINOPHIL NFR BLD AUTO: 0.1 %
ERYTHROCYTE [DISTWIDTH] IN BLOOD BY AUTOMATED COUNT: 15.5 % (ref 11–15)
GLUCOSE BLD-MCNC: 100 MG/DL (ref 70–99)
HCT VFR BLD AUTO: 34.2 %
HGB BLD-MCNC: 10.4 G/DL
IMM GRANULOCYTES # BLD AUTO: 0.06 X10(3) UL (ref 0–1)
IMM GRANULOCYTES NFR BLD: 0.4 %
LYMPHOCYTES # BLD AUTO: 7.72 X10(3) UL (ref 1–4)
LYMPHOCYTES NFR BLD AUTO: 47.4 %
MCH RBC QN AUTO: 22.4 PG (ref 26–34)
MCHC RBC AUTO-ENTMCNC: 30.4 G/DL (ref 31–37)
MCV RBC AUTO: 73.7 FL
MONOCYTES # BLD AUTO: 0.95 X10(3) UL (ref 0.1–1)
MONOCYTES NFR BLD AUTO: 5.8 %
NEUTROPHILS # BLD AUTO: 7.53 X10 (3) UL (ref 1.5–7.7)
NEUTROPHILS # BLD AUTO: 7.53 X10(3) UL (ref 1.5–7.7)
NEUTROPHILS NFR BLD AUTO: 46.1 %
PLATELET # BLD AUTO: 394 10(3)UL (ref 150–450)
POTASSIUM SERPL-SCNC: 4 MMOL/L (ref 3.5–5.1)
RBC # BLD AUTO: 4.64 X10(6)UL
SODIUM SERPL-SCNC: 136 MMOL/L (ref 136–145)
WBC # BLD AUTO: 16.3 X10(3) UL (ref 4–11)

## 2023-12-12 RX ORDER — ACETAMINOPHEN 160 MG/5ML
1000 SOLUTION ORAL EVERY 8 HOURS
Qty: 473 ML | Refills: 1 | Status: SHIPPED | OUTPATIENT
Start: 2023-12-12

## 2023-12-12 RX ORDER — PANTOPRAZOLE SODIUM 40 MG/1
40 TABLET, DELAYED RELEASE ORAL
Qty: 30 TABLET | Refills: 2 | Status: SHIPPED | OUTPATIENT
Start: 2023-12-13

## 2023-12-12 RX ORDER — OXYCODONE HYDROCHLORIDE 5 MG/1
5 TABLET ORAL EVERY 6 HOURS PRN
Qty: 10 TABLET | Refills: 0 | Status: SHIPPED | OUTPATIENT
Start: 2023-12-12

## 2023-12-12 NOTE — PROGRESS NOTES
Provided/reviewed bariatric post-op discharge instructions; stressed importance of fluids aim for 64 ozs/day, caring for incisions, activities/allowed/avoid; meds to take/avoid; importance of bariatric vits; pt planning to take Bariatric Choice; managing constipation; post-op fu; pt has appt next week w/ surgeon; signs and symptoms of concern; contact info; pt agreed and verbalized understanding.

## 2023-12-12 NOTE — PLAN OF CARE
Noah Zarate is stable for discharge- educated on diet- liquids 4 oz per hour- no more- follow diet progression per dietician- f/u w Dr. Cardona [place] in one week. s/s infection reviewed- continue deep breathing and coughing- pain meds reviewed- protonix reviewed -all questioned answered- may shower- no lotions oils or creams to lap sites- verb good understanding. Saline lock removed. - mother and sister here to transport home- stable condition- discharge via wheelchair  Problem: PAIN - ADULT  Goal: Verbalizes/displays adequate comfort level or patient's stated pain goal  Description: INTERVENTIONS:  - Encourage pt to monitor pain and request assistance  - Assess pain using appropriate pain scale  - Administer analgesics based on type and severity of pain and evaluate response  - Implement non-pharmacological measures as appropriate and evaluate response  - Consider cultural and social influences on pain and pain management  - Manage/alleviate anxiety  - Utilize distraction and/or relaxation techniques  - Monitor for opioid side effects  - Notify MD/LIP if interventions unsuccessful or patient reports new pain  - Anticipate increased pain with activity and pre-medicate as appropriate  Outcome: Progressing     Problem: RISK FOR INFECTION - ADULT  Goal: Absence of fever/infection during anticipated neutropenic period  Description: INTERVENTIONS  - Monitor WBC  - Administer growth factors as ordered  - Implement neutropenic guidelines  Outcome: Progressing     Problem: SAFETY ADULT - FALL  Goal: Free from fall injury  Description: INTERVENTIONS:  - Assess pt frequently for physical needs  - Identify cognitive and physical deficits and behaviors that affect risk of falls.   - [place] fall precautions as indicated by assessment.  - Educate pt/family on patient safety including physical limitations  - Instruct pt to call for assistance with activity based on assessment  - Modify environment to reduce risk of injury  - Provide assistive devices as appropriate  - Consider OT/PT consult to assist with strengthening/mobility  - Encourage toileting schedule  Outcome: Progressing     Problem: DISCHARGE PLANNING  Goal: Discharge to home or other facility with appropriate resources  Description: INTERVENTIONS:  - Identify barriers to discharge w/pt and caregiver  - Include patient/family/discharge partner in discharge planning  - Arrange for needed discharge resources and transportation as appropriate  - Identify discharge learning needs (meds, wound care, etc)  - Arrange for interpreters to assist at discharge as needed  - Consider post-discharge preferences of patient/family/discharge partner  - Complete POLST form as appropriate  - Assess patient's ability to be responsible for managing their own health  - Refer to Case Management Department for coordinating discharge planning if the patient needs post-hospital services based on physician/LIP order or complex needs related to functional status, cognitive ability or social support system  Outcome: Progressing

## 2023-12-12 NOTE — DISCHARGE INSTRUCTIONS
Continue to have goal for 4 oz of fluids per hour- do not do more than 4 oz in an an hour    Follow diet progression as instructed by dietician    Keep incisions clean and dry    May shower    Continue to cough and deep breathe    Call surgeon for any problems like fevers above 100,5, redness or tenderness at incision sites, severe nausea/emesis    No heavy lifting, pushing or pulling- not over 10 lbs

## 2023-12-12 NOTE — PAYOR COMM NOTE
Discharge Notification    Patient Name: Cindy Drivers: Deonte Taylor #: [id number]  Authorization Number: [id number]  Admit Date/Time: 12/11/2023 6:52 AM  Discharge Date/Time: 12/12/2023 11:07 AM

## 2023-12-14 ENCOUNTER — TELEPHONE (OUTPATIENT)
Dept: SURGERY | Facility: CLINIC | Age: 43
End: 2023-12-14

## 2023-12-20 ENCOUNTER — TELEPHONE (OUTPATIENT)
Dept: SURGERY | Facility: CLINIC | Age: 43
End: 2023-12-20

## 2023-12-20 ENCOUNTER — OFFICE VISIT (OUTPATIENT)
Dept: SURGERY | Facility: CLINIC | Age: 43
End: 2023-12-20
Payer: COMMERCIAL

## 2023-12-20 ENCOUNTER — DOCUMENTATION ONLY (OUTPATIENT)
Dept: SURGERY | Facility: CLINIC | Age: 43
End: 2023-12-20

## 2023-12-20 VITALS
HEIGHT: 64.3 IN | WEIGHT: 258 LBS | BODY MASS INDEX: 44.05 KG/M2 | SYSTOLIC BLOOD PRESSURE: 122 MMHG | HEART RATE: 90 BPM | OXYGEN SATURATION: 98 % | TEMPERATURE: 97 F | DIASTOLIC BLOOD PRESSURE: 80 MMHG

## 2023-12-20 DIAGNOSIS — Z98.84 S/P LAPAROSCOPIC SLEEVE GASTRECTOMY: ICD-10-CM

## 2023-12-20 DIAGNOSIS — E66.01 MORBID (SEVERE) OBESITY DUE TO EXCESS CALORIES (HCC): Primary | ICD-10-CM

## 2023-12-20 NOTE — PROGRESS NOTES
Provided/reviewed bariatric post-op orientation and Bariatric HELP card; instructed pt to aim for 64 ozs fluids/day; current intake ~ 49-64 + ozs/day; taking Bariatric Choice 4x/day; meds to take/avoid; activities/allowed/avoid; signs and symptoms of concern; contact info; also instructed pt to keep card in wallet and in the unlikely event pt ends up int ED/IC/UC to present card to MD and inform bariatric staff; pt agreed and verbalized understanding.

## 2024-01-09 ENCOUNTER — OFFICE VISIT (OUTPATIENT)
Dept: SURGERY | Facility: CLINIC | Age: 44
End: 2024-01-09
Payer: COMMERCIAL

## 2024-01-09 VITALS — BODY MASS INDEX: 43.42 KG/M2 | HEIGHT: 64.3 IN | WEIGHT: 254.31 LBS

## 2024-01-09 DIAGNOSIS — E66.01 CLASS 3 SEVERE OBESITY WITH BODY MASS INDEX (BMI) OF 40.0 TO 44.9 IN ADULT, UNSPECIFIED OBESITY TYPE, UNSPECIFIED WHETHER SERIOUS COMORBIDITY PRESENT (HCC): Primary | ICD-10-CM

## 2024-01-09 DIAGNOSIS — Z98.84 S/P LAPAROSCOPIC SLEEVE GASTRECTOMY: ICD-10-CM

## 2024-01-09 PROCEDURE — 97803 MED NUTRITION INDIV SUBSEQ: CPT

## 2024-01-09 PROCEDURE — 3008F BODY MASS INDEX DOCD: CPT

## 2024-01-09 NOTE — PATIENT INSTRUCTIONS
Recommendations/goals:      1.  Restart the food record, My Net Diary, select the macros dashboard.  2.  Continue to consume at least 4-6 meals/snacks per day.  Include protein and produce when you eat.  Aim for 60 grams of protein per day.  Try to keep the carbohydrates at 50 grams per day or less.  (Try edamame or black bean spaghetti or fresh edamame)    3.  Continue to practice eating strategies, eat separately from drinking, avoid straws, chew food 20-30 times before swallowing.  Make the meals last 30 minutes.  4.  Continue to drink at least 64 oz per day of water.  (Try  Protein water, adding True Lemon, Crystal Light).  5. Continue to exercise with a goal of 30 minutes per day for exercise (for example,walking).    6.  Add strength training 10 minutes 3 days per week in a couple weeks with Dr. Hamilton's approval.  7.  May switch to the bariatric vitamin capsule in a couple weeks with Dr. Hamilton's approval.

## 2024-01-09 NOTE — PROGRESS NOTES
[facility]  [address]  [address]  [place] [place] [address]  Dept: [phone]  Loc: [phone]    01/09/24      Bariatric Follow-up Nutrition Session    Lin Aldridge is a 43 year old female.     Assessment     Procedure:  Vertical Sleeve Gastrectomy  Here for medical weight management: no  Revision:  n/a  Surgery Date:  12/11/23  Medical Diagnosis:  obesity grade III    Labs:  Lab Results   Component Value Date     (H) 12/12/2023    BUN <5 (L) 12/12/2023    BUNCREA 13.8 11/11/2023    CREATSERUM 0.65 12/12/2023    ANIONGAP 4 12/12/2023    CA 9.1 12/12/2023    OSMOCALC 282 11/11/2023    ALKPHO 55 11/11/2023    AST 12 11/11/2023    ALT 7 (L) 11/11/2023    BILT 0.4 11/11/2023    TP 7.2 11/11/2023    ALB 4.5 11/11/2023    GLOBULIN 2.7 (L) 11/11/2023     12/12/2023    K 4.0 12/12/2023     12/12/2023    CO2 27.0 12/12/2023     No results found for: \"MG\"   Phosphorus (mg/dL)   Date Value   02/14/2023 4.0       Thyroid:    Lab Results   Component Value Date    TSH 2.420 07/06/2023       Iron Panel:  Lab Results   Component Value Date    IRON 77 07/06/2023    TRANSFERRIN 271 07/06/2023    TIBCP 404 07/06/2023    SAT 19 07/06/2023       CBC:  Lab Results   Component Value Date    WBC 16.3 (H) 12/12/2023    WBC 17.9 (H) 11/11/2023    WBC 15.3 (H) 08/08/2023     Lab Results   Component Value Date    HGB 10.4 (L) 12/12/2023    HGB 12.0 11/11/2023    HGB 11.3 (L) 08/08/2023      Lab Results   Component Value Date    .0 12/12/2023    .0 11/11/2023    .0 08/08/2023       Diabetes:    Lab Results   Component Value Date     07/06/2023    A1C 5.6 07/06/2023       Lipid Panel:  Lab Results   Component Value Date    CHOLEST 157 07/06/2023    TRIG 102 07/06/2023    HDL 47 07/06/2023    LDL 91 07/06/2023    VLDL 17 07/06/2023    NONHDLC 110 07/06/2023        Vitamins/Minerals:  Lab Results   Component Value Date    B12 696 07/06/2023      Lab Results   Component Value Date    VITD 12.5 (L) 07/06/2023     No results found for: \"THIAMINE\"   Lab Results   Component Value Date/Time    VITB1 89.4 07/06/2023 09:35 AM     Lab Results   Component Value Date/Time    FOLIC 4.0 (L) 07/06/2023 09:35 AM        Meds:     Current Outpatient Medications:     acetaminophen 160 MG/5ML Oral Solution, Take 31.25 mL (1,000 mg total) by mouth every 8 (eight) hours., Disp: 473 mL, Rfl: 1    oxyCODONE 5 MG Oral Tab, Take 1 tablet (5 mg total) by mouth every 6 (six) hours as needed., Disp: 10 tablet, Rfl: 0    pantoprazole 40 MG Oral Tab EC, Take 1 tablet (40 mg total) by mouth every morning before breakfast., Disp: 30 tablet, Rfl: 2    Height:    Ht Readings from Last 1 Encounters:   01/09/24 5' 4.3\" (1.633 m)     Weight:    Wt Readings from Last 6 Encounters:   01/09/24 254 lb 4.8 oz (115.3 kg)   12/20/23 258 lb (117 kg)   12/11/23 263 lb (119.3 kg)   11/27/23 272 lb (123.4 kg)   11/16/23 274 lb 4.8 oz (124.4 kg)   11/11/23 271 lb (122.9 kg)       Weight change:  down 13.7 lbs since day of surgery  Post-Op Excess Body Weight Loss: 11.1% EBWL  BMI: Body mass index is 43.24 kg/m².    Protein Intake: 67-75 grams/day  Fluid intake:  67+ ounces/day    Diet history:       Breakfast      AM Snack       Lunch     PM Snack     Dinner   Egg 1 or protein shake Premier half or Fairllife half skip Spinach and herb wrap half with turkey 1 oz and spinach and arugula garlic aoli and half cucumber No added sugar jello or applesauce or greek yogurt   Chicken or salmon 1.5 oz and cucumbers and broccoli               Total Calories:  unsure not keeping a food record  Excessive in: nothing  Inadequate in:  protein on some days     Patient has made some modifications and adjustments to diet: yes, is eating 4 times per day, is eating separately from drinking-waiting 30 minutes after eating to drink, is chewing well before swallowing  Food intolerances:  no  Vitamin/mineral supplements:   Bariatric Choice  Protein supplements:  Slimfast     Activity Level: active  Type: walk  Duration: 120 minutes  Frequency: per day    Other:  Met with pt for one month post op visit.  Pt is 11.1% EBWL s/p VSG with Dr. Hamilton on 12/11/23.  Pt is making great choices for protein and produce at meals.  Per diet recall pt is consuming an estimated 67-75 gms of protein per day.  Encouraged pt to keep a food record, at least once per week to ensure meeting protein, calorie and carbohydrate markers.  Pt verbalized understanding.  Per pt is eating 4 times per day, is eating separately from drinking-waiting 30 minutes after eating to drink, is chewing well before swallowing.  Per pt is taking Bariatric Choice 4 chewable multivitamins per day.  Per pt is drinking adequate amounts of water per day.  Per pt is tolerating all foods to date.  Per pt is walking 120 minutes per day.  Per pt is feeling good since losing weight since the surgery. Pt congratulated on embracing lifestyle changes. Pt with follow up visit scheduled in three months to ensure meeting protein martin.    Nutrition Diagnosis     Nutrition Diagnosis: Morbid obesity: Improvement shown     Intervention     Recommendations/goals:      1.  Restart the food record, My Net Diary, select the macros dashboard.  2.  Continue to consume at least 4-6 meals/snacks per day.  Include protein and produce when you eat.  Aim for 60 grams of protein per day.  Try to keep the carbohydrates at 50 grams per day or less.  (Try edamame or black bean spaghetti or fresh edamame)    3.  Continue to practice eating strategies, eat separately from drinking, avoid straws, chew food 20-30 times before swallowing.  Make the meals last 30 minutes.  4.  Continue to drink at least 64 oz per day of water.  (Try  Protein water, adding True Lemon, Crystal Light).  5. Continue to exercise with a goal of 30 minutes per day for exercise (for example,walking).    6.  Add strength training 10 minutes 3  days per week in a couple weeks with Dr. Hamilton's approval.  7.  May switch to the bariatric vitamin capsule in a couple weeks with Dr. Hamilton's approval.          Monitor/Evaluate     Anthropometric measurements, Food/fluid intake/choices, Food intolerances, Activity level, Vitamin/mineral supplementation, Reinforce goals, and Calorie/protein intake  Additional RD visits required to review concepts? yes  Patient understands protein requirements? yes  Patient understand fluid requirements (amount and method of intake)? yes  Patient understands post-operative diet? yes  Patient keeping consistent food records? Yes, but fell off   Patient ready for Liquid Protein Education? N/a      Jaylyn Heredia RD, LDN  Face-to-face time spent with pt: 45 minutes

## 2024-01-24 ENCOUNTER — OFFICE VISIT (OUTPATIENT)
Dept: SURGERY | Facility: CLINIC | Age: 44
End: 2024-01-24
Payer: COMMERCIAL

## 2024-01-24 VITALS
WEIGHT: 249 LBS | OXYGEN SATURATION: 100 % | DIASTOLIC BLOOD PRESSURE: 80 MMHG | HEART RATE: 70 BPM | HEIGHT: 64.3 IN | BODY MASS INDEX: 42.51 KG/M2 | SYSTOLIC BLOOD PRESSURE: 110 MMHG

## 2024-01-24 DIAGNOSIS — E66.01 MORBID OBESITY WITH BMI OF 45.0-49.9, ADULT (HCC): Primary | ICD-10-CM

## 2024-01-24 DIAGNOSIS — Z98.84 S/P LAPAROSCOPIC SLEEVE GASTRECTOMY: ICD-10-CM

## 2024-01-24 DIAGNOSIS — E66.01 MORBID (SEVERE) OBESITY DUE TO EXCESS CALORIES (HCC): ICD-10-CM

## 2024-01-24 NOTE — PROGRESS NOTES
[facility], [facility], [place]  [address] [facility] [address] [place] [id number]  Dept: [phone]    1/24/2024   Bariatric Surgery Patient Evaluation    Chief Complaint:  morbid obesity, preop 271, 12/11/23 sleeve    History of Present Illness:  Lin Aldridge is a 43 year old female presenting for surgical follow-up.  Today she weighs 249 pounds which is 9 fewer than last visit.  All pain has resolved and she has moved through diet stages without any nausea or vomiting.  She has been intermittently logging her diet, states that she stays under 2000 calories as listed in the berkley.    Past Medical History:    Past Medical History:   Diagnosis Date    Calculus of kidney     CLL (chronic lymphocytic leukemia) (HCC) 11/08/2023    Esophageal reflux     Morbid obesity with BMI of 45.0-49.9, adult (HCC)     Ovarian cyst     S/P laparoscopic sleeve gastrectomy 12/20/2023    Visual impairment     glasses       Past Surgical History:    Past Surgical History:   Procedure Laterality Date    CYSTOSCOPY,INSERT URETERAL STENT      HC CERVICAL CERCLAGE      OTHER SURGICAL HISTORY Right     ACL / meniscus repair    REMOVAL OF KIDNEY STONE  2023       Family History:    Family History   Problem Relation Age of Onset    Hypertension Mother     High Cholesterol Mother     Diabetes Mother     Other (Other) Father         neuropathy    Diabetes Father     Heart Disease Father        Social History:    Social History     Socioeconomic History    Marital status: Single   Tobacco Use    Smoking status: Never    Smokeless tobacco: Never   Vaping Use    Vaping Use: Never used   Substance and Sexual Activity    Alcohol use: Not Currently     Comment: occasionally    Drug use: No       Medications:   Current Outpatient Medications:     acetaminophen 160 MG/5ML Oral Solution, Take 31.25 mL (1,000 mg total) by mouth every 8 (eight) hours., Disp: 473 mL, Rfl: 1    oxyCODONE 5 MG Oral Tab, Take 1 tablet  (5 mg total) by mouth every 6 (six) hours as needed., Disp: 10 tablet, Rfl: 0    pantoprazole 40 MG Oral Tab EC, Take 1 tablet (40 mg total) by mouth every morning before breakfast., Disp: 30 tablet, Rfl: 2     Allergies:    Allergies   Allergen Reactions    Penicillins RASH     As a baby       ROS:      Constitutional: negative  HEENT: negative  Respiratory: negative  Cardiovascular: negative  Gastrointestinal: negative  Genitourinary: negative  Musculoskeletal: negative  Neurological: negative  Psychological: negative  Endocrine: negative  Immunologic: negative  Integumentary: negative      Physical Exam:  Vital Signs:  /80   Pulse 70   Ht 5' 4.3\" (1.633 m)   Wt 249 lb (112.9 kg)   LMP 11/26/2023 (Exact Date)   SpO2 100%   BMI 42.34 kg/m²   BMI:  Body mass index is 42.34 kg/m².  General: no acute distress, well-nourished  HENT: normocephalic, atraumatic  Lung: breathing comfortably, symmetrical expansion, no wheezing  Heart: regular rate and rhythm  Abdomen: soft, non-tender, non-distended, no hernia/mass/organomegaly, incisions completely healed  Extremities: normal strength, normal ROM, walks without assist device  Neuro: no focal deficits, cranial nerves grossly intact  Psych: alert and oriented, normal affect  Skin: warm, dry    Assessment: 43 year old female with chronic morbid obesity who would benefit from significant and sustained weight loss, now status post sleeve gastrectomy with no complaints but suboptimal weight loss due to noncompliance with calorie counting and daily goals.  She is unable to even tell me her target calories per day (600) which is well documented in her preop materials.  She showed me her log which is missing any  for several weeks and on the days she does have something recorded is well above her target.  She is adamant that she is eating the right things and the missing days are due to technical difficulties with the berkley.  Frankly this is not realistic.  I  challenged her to do better and prove me wrong.    Plan:      Compliance with portion controlled meals  Cont fluid and protein goals  Cont PPI for total of 3 months  Exercise - may increase activity, no restrictions now  No longer has to crush pills or use chewable tablets  Follow-up as scheduled for 3 month post-op visit    Robert Hamilton MD, 01/24/24, 9:23 AM

## 2024-02-16 ENCOUNTER — OFFICE VISIT (OUTPATIENT)
Dept: SURGERY | Facility: CLINIC | Age: 44
End: 2024-02-16
Payer: COMMERCIAL

## 2024-02-16 VITALS
HEART RATE: 92 BPM | HEIGHT: 64.3 IN | BODY MASS INDEX: 40.17 KG/M2 | WEIGHT: 235.31 LBS | DIASTOLIC BLOOD PRESSURE: 80 MMHG | OXYGEN SATURATION: 100 % | SYSTOLIC BLOOD PRESSURE: 124 MMHG

## 2024-02-16 DIAGNOSIS — Z98.84 S/P LAPAROSCOPIC SLEEVE GASTRECTOMY: ICD-10-CM

## 2024-02-16 DIAGNOSIS — E55.9 VITAMIN D DEFICIENCY: ICD-10-CM

## 2024-02-16 DIAGNOSIS — E44.1 MILD PROTEIN-CALORIE MALNUTRITION (HCC): Primary | ICD-10-CM

## 2024-02-16 DIAGNOSIS — E66.01 MORBID OBESITY WITH BMI OF 40.0-44.9, ADULT (HCC): ICD-10-CM

## 2024-02-16 PROCEDURE — 99214 OFFICE O/P EST MOD 30 MIN: CPT | Performed by: INTERNAL MEDICINE

## 2024-02-16 NOTE — PROGRESS NOTES
[facility]  [address] [facility] [address] [place] [id number]  Dept: [phone]    Patient:  Lin Aldridge  :      10/12/1980  MRN:      [id number]    Referring Provider: Kori Carl     Chief Complaint:     Chief Complaint   Patient presents with    Follow - Up    Weight Management    Post-Op       Date of Surgery:   2023  Surgery Type:   Sleeve gastrectomy     Subjective     Satiety:  positive  Food Intake:  <1 cup(s)  Fluid Intake:  64 oz  Protein Intake:  adeq grams  Vitamin:  Yes   One a day capsules  Exercise: Yes  Comorbidities:  Back pain-Improvement?  yes, Joint pain-Improvement?  yes, MIRELA-Improvement?  yes, and Snoring-Improvement?  yes    Objective     Vitals: /80   Pulse 92   Ht 5' 4.3\" (1.633 m)   Wt 235 lb 4.8 oz (106.7 kg)   LMP 2023 (Exact Date)   SpO2 100%   BMI 40.01 kg/m²     Starting weight: 283   Current weight: 235 lb 4.8 oz (106.7 kg)   Interval weight loss: -37   Total weight loss:  -48    Last 3 Weights   24 0935 235 lb 4.8 oz (106.7 kg)   24 0902 249 lb (112.9 kg)   24 1118 254 lb 4.8 oz (115.3 kg)       Patient Medications:    Current Outpatient Medications:     pantoprazole 40 MG Oral Tab EC, Take 1 tablet (40 mg total) by mouth every morning before breakfast., Disp: 30 tablet, Rfl: 2    Allergies:  Penicillins     Social History:    Social History     Socioeconomic History    Marital status: Single   Tobacco Use    Smoking status: Never    Smokeless tobacco: Never   Vaping Use    Vaping Use: Never used   Substance and Sexual Activity    Alcohol use: Not Currently     Comment: occasionally    Drug use: No     Surgical History:    Past Surgical History:   Procedure Laterality Date    CYSTOSCOPY,INSERT URETERAL STENT      HC CERVICAL CERCLAGE      LAP SLEEVE GASTRECTOMY  2023    Dr Hamilton, [facility]    OTHER SURGICAL HISTORY Right     ACL / meniscus repair    REMOVAL OF  KIDNEY STONE  2023       Family History:    Family History   Problem Relation Age of Onset    Hypertension Mother     High Cholesterol Mother     Diabetes Mother     Other (Other) Father         neuropathy    Diabetes Father     Heart Disease Father        Physical Exam:  Vital signs: /80   Pulse 92   Ht 5' 4.3\" (1.633 m)   Wt 235 lb 4.8 oz (106.7 kg)   LMP 11/26/2023 (Exact Date)   SpO2 100%   BMI 40.01 kg/m²   General appearance: alert, appears stated age, cooperative, and moderately obese  Head: Normocephalic, without obvious abnormality, atraumatic  Back: symmetric, no curvature. ROM normal. No CVA tenderness.  Lungs: clear to auscultation bilaterally  Heart: S1, S2 normal, no murmur, click, rub or gallop, regular rate and rhythm  Abdomen:  soft, obese, non tender. Incisions healed well  Extremities: extremities normal, atraumatic, no cyanosis or edema  Pulses: 2+ and symmetric  Skin: Skin color, texture, turgor normal. No rashes or lesions  Neurologic: Grossly normal   ROS:    Constitutional: negative  Respiratory: negative  Cardiovascular: negative  Gastrointestinal: negative  Musculoskeletal:negative  Neurological: negative  Behavioral/Psych: negative  Endocrine: negative  All other systems were reviewed and are negative    Assessment       Encounter Diagnosis(ses):   Encounter Diagnoses   Name Primary?    Mild protein-calorie malnutrition (HCC) Yes    S/P laparoscopic sleeve gastrectomy     Morbid obesity with BMI of 40.0-44.9, adult (HCC)        Plan     S/P VSG 12/11/2023  Doing well    Keep up with protein and fluids    Losing inches    Should expect 7 lbs a month weight loss    Increase activity  Continue with MVI    Blood work at six month martin    Follow up with RD and surgical team      Migue Baxter MD

## 2024-03-06 ENCOUNTER — TELEPHONE (OUTPATIENT)
Dept: SURGERY | Facility: CLINIC | Age: 44
End: 2024-03-06

## 2024-03-06 ENCOUNTER — OFFICE VISIT (OUTPATIENT)
Dept: SURGERY | Facility: CLINIC | Age: 44
End: 2024-03-06
Payer: COMMERCIAL

## 2024-03-06 VITALS
HEART RATE: 68 BPM | HEIGHT: 64.3 IN | BODY MASS INDEX: 39.27 KG/M2 | SYSTOLIC BLOOD PRESSURE: 122 MMHG | WEIGHT: 230 LBS | DIASTOLIC BLOOD PRESSURE: 80 MMHG | OXYGEN SATURATION: 99 %

## 2024-03-06 DIAGNOSIS — E66.01 MORBID OBESITY WITH BMI OF 40.0-44.9, ADULT (HCC): Primary | ICD-10-CM

## 2024-03-06 NOTE — PROGRESS NOTES
[facility], [facility], [place]  [address] [facility] [address] [place] [address]  Dept: [phone]    3/6/2024   Bariatric Surgery Patient Evaluation    Chief Complaint:  morbid obesity, preop 271, 12/11/23 sleeve    History of Present Illness:  Lin Aldridge is a 43 year old female presenting for surgical follow-up.  Today she weighs 230 pounds which is 19 fewer than last visit.  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    Past Medical History:    Past Medical History:   Diagnosis Date    Calculus of kidney     CLL (chronic lymphocytic leukemia) (HCC) 11/08/2023    Esophageal reflux     Morbid obesity with BMI of 45.0-49.9, adult (HCC)     Ovarian cyst     S/P gastric sleeve procedure 12/11/2023    weight loss    S/P laparoscopic sleeve gastrectomy 12/20/2023    Visual impairment     glasses       Past Surgical History:    Past Surgical History:   Procedure Laterality Date    CYSTOSCOPY,INSERT URETERAL STENT      HC CERVICAL CERCLAGE      LAP SLEEVE GASTRECTOMY  12/11/2023    Dr Hamilton, [facility]    OTHER SURGICAL HISTORY Right     ACL / meniscus repair    REMOVAL OF KIDNEY STONE  2023       Family History:    Family History   Problem Relation Age of Onset    Hypertension Mother     High Cholesterol Mother     Diabetes Mother     Other (Other) Father         neuropathy    Diabetes Father     Heart Disease Father        Social History:    Social History     Socioeconomic History    Marital status: Single   Tobacco Use    Smoking status: Never    Smokeless tobacco: Never   Vaping Use    Vaping Use: Never used   Substance and Sexual Activity    Alcohol use: Not Currently     Comment: occasionally    Drug use: No       Medications:   Current Outpatient Medications:     Multiple Vitamins-Minerals (BARIATRIC MULTIVITAMINS/IRON OR), Take by mouth., Disp: , Rfl:     pantoprazole 40 MG Oral Tab  EC, Take 1 tablet (40 mg total) by mouth every morning before breakfast., Disp: 30 tablet, Rfl: 2     Allergies:    Allergies   Allergen Reactions    Penicillins RASH     As a baby       ROS:      Constitutional: negative  HEENT: negative  Respiratory: negative  Cardiovascular: negative  Gastrointestinal: negative  Genitourinary: negative  Musculoskeletal: negative  Neurological: negative  Psychological: negative  Endocrine: negative  Immunologic: negative  Integumentary: negative      Physical Exam:  Vital Signs:  /80   Pulse 68   Ht 5' 4.3\" (1.633 m)   Wt 230 lb (104.3 kg)   LMP 11/26/2023 (Exact Date)   SpO2 99%   BMI 39.11 kg/m²   BMI:  Body mass index is 39.11 kg/m².  General: no acute distress, well-nourished  HENT: normocephalic, atraumatic  Lung: breathing comfortably, symmetrical expansion, no wheezing  Heart: regular rate and rhythm  Abdomen: soft, non-tender, non-distended, no hernia/mass/organomegaly, incisions completely healed  Extremities: normal strength, normal ROM, walks without assist device  Neuro: no focal deficits, cranial nerves grossly intact  Psych: alert and oriented, normal affect  Skin: warm, dry    Assessment: 43 year old female with chronic morbid obesity who would benefit from significant and sustained weight loss, now status post sleeve gastrectomy with no complaints, previous suboptimal weight loss but seems to be better now after a little push at the last visit.  I consider myself proven wrong ;)      Plan:      Cont portion controlled meals  Cont fluid and protein goals  PPI done  Exercise - continue to increase as tolerated  Follow-up as scheduled for 6 month post-op visit    Robert Hamilton MD, 03/06/24, 11:33 AM

## 2024-10-03 ENCOUNTER — LAB ENCOUNTER (OUTPATIENT)
Dept: LAB | Facility: HOSPITAL | Age: 44
End: 2024-10-03
Attending: STUDENT IN AN ORGANIZED HEALTH CARE EDUCATION/TRAINING PROGRAM
Payer: COMMERCIAL

## 2024-10-03 ENCOUNTER — OFFICE VISIT (OUTPATIENT)
Dept: OBGYN CLINIC | Facility: CLINIC | Age: 44
End: 2024-10-03
Payer: COMMERCIAL

## 2024-10-03 VITALS
DIASTOLIC BLOOD PRESSURE: 92 MMHG | BODY MASS INDEX: 36.16 KG/M2 | HEIGHT: 64 IN | WEIGHT: 211.81 LBS | SYSTOLIC BLOOD PRESSURE: 137 MMHG

## 2024-10-03 DIAGNOSIS — N76.0 VAGINITIS AND VULVOVAGINITIS: ICD-10-CM

## 2024-10-03 DIAGNOSIS — N76.0 VAGINITIS AND VULVOVAGINITIS: Primary | ICD-10-CM

## 2024-10-03 DIAGNOSIS — Z11.3 SCREENING EXAMINATION FOR STD (SEXUALLY TRANSMITTED DISEASE): ICD-10-CM

## 2024-10-03 LAB
HBV SURFACE AG SER-ACNC: <0.1 [IU]/L
HBV SURFACE AG SERPL QL IA: NONREACTIVE
HCV AB SERPL QL IA: NONREACTIVE
T PALLIDUM AB SER QL IA: REACTIVE

## 2024-10-03 PROCEDURE — 87389 HIV-1 AG W/HIV-1&-2 AB AG IA: CPT

## 2024-10-03 PROCEDURE — 86592 SYPHILIS TEST NON-TREP QUAL: CPT

## 2024-10-03 PROCEDURE — 36415 COLL VENOUS BLD VENIPUNCTURE: CPT

## 2024-10-03 PROCEDURE — 86780 TREPONEMA PALLIDUM: CPT

## 2024-10-03 PROCEDURE — 99213 OFFICE O/P EST LOW 20 MIN: CPT | Performed by: STUDENT IN AN ORGANIZED HEALTH CARE EDUCATION/TRAINING PROGRAM

## 2024-10-03 PROCEDURE — 87340 HEPATITIS B SURFACE AG IA: CPT

## 2024-10-03 PROCEDURE — 86803 HEPATITIS C AB TEST: CPT

## 2024-10-03 PROCEDURE — 86593 SYPHILIS TEST NON-TREP QUANT: CPT

## 2024-10-03 NOTE — PATIENT INSTRUCTIONS
Guidelines for Vulvar Skin Care    NOTE: The goal is to promote healthy vulvar skin. This is done by decreasing and removing chemicals, moisture, or rubbing (friction). Products listed below have been suggested for use because of their past success in helping to decrease or relieve vulvar/ vaginal burning, irritation and itching.    LAUNDRY PRODUCTS  1. Use a detergent free of dyes, enzymes and perfumes (such as ALL-Free and Clear) on all clothing. Use 1/3 to 1/2 the suggested amount per load.  2. Do not use a fabric softener in the washer or dryer, even those advertised as \"free\".  If you use a shared dryer or laundromat, you must hang dry your underwear, towels, and    any other clothing that come in contact with your vulva.   3. Stain removing products (including bleach). Soak and rinse in clear water all           underwear and towels on which you have used a stain-removing product. Then wash in your regular washing cycle. This removes as much of the product as possible.  White vinegar, 1/4 - 1/3 cup per laundry load, can be used to freshen clothing and remove oils.     CLOTHING  1. Wear white all cotton underwear, not nylon with a cotton crotch. Cotton allows air in and moisture out. Do not wear underwear when sleeping at night. Loose fitting boxers or pajama bottoms are fine.  2. Avoid pantyhose. If you must wear them, either cut out the laz crotch (if you cut   out the crotch, be sure to leave about 1/4 to 1/2 inch of fabric from the seam to prevent       running), or wear thigh high hose. Many stores now carry thigh high nylons.   3. Avoid tight clothing, especially clothing made of synthetic fabrics. Remove wet                 bathing and exercise clothing as soon as you can.    BATHING AND HYGIENE  1. Do not use bath soaps, lotions, gels, etc., which contain perfumes. These may       smell nice but can be irritating. This includes many baby products and feminine hygiene   products marked \"gentle\" or  \"mild\". Dove for Sensitive Skin, Neutrogena, Basis,    Aveeno, or Pears are the soaps we suggest. Do not use soap directly on the vulvar skin.               Just warm water and your hand will keep the vulvar area clean without irritating the skin.  2. Do not use bubble bath, bath salts and scented oils. You may apply a neutral (unscented, non-perfumed) oil or lotion to damp skin after getting out of the tub or shower. Do not apply lotion directly to the vulva.  3. Do not scrub vulvar skin with a washcloth. Washing with your hand and warm water is enough for good cleaning.  4. Pat dry rather than rubbing with a towel. Or use a hairdryer on a cool setting to dry the vulva.  5. Baking Soda soaks. Soak in lukewarm (not hot) bath water with 4-5 tablespoons of baking soda to help soothe vulvar itching and burning. Soak 1 to 3 times a day for 5-10 minutes. If you are using a sitz bath, use 1-2 teaspoons of baking soda.  6. Use white, unscented toilet paper. If paper has a perfumed scent or lotion, avoid using it.  7. Do not use feminine hygiene sprays, perfumes, adult, or baby wipes. If urine causes burning of the skin, pour lukewarm water over the vulva while urinating. Pat dry rather than wiping.  8. Do not use deodorized pads and tampons. Tampons may be used when the blood flow is heavy enough to soak one tampon in four hours or less. Tampons are safe for most women, but wearing them too long or when the blood flow is light may result in vaginal infection, increased discharge, odor, or toxic shock syndrome. Also, use only pads that have a cotton liner that comes in contact with your skin.  9. Do not use over the counter creams or ointments, except A&D or zinc oxide ointment. Ask your health provider first. When buying ointments, be sure that they are paraben and fragrance-free.  10. Small amounts of A&D Ointment, olive oil, vegetable oil or zinc oxide may be applied to your vulva as often as needed to protect the skin.  These products also help to decrease skin irritation during your period and when you urinate. If wearing wool causes you to itch, do not use A&D ointment, as it contains lanolin.  11. DO NOT DOUCHE. Baking soda soaks or rinsing with warm water will help rinse away extra discharge and help with odor.  12. DO NOT SHAVE or use hair removing products on the vulvar area. You may use scissors to trim the pubic hair close to the vulva.  13. Some women may have problems with chronic dampness. Keeping dry is important.   Do not wear pads on a daily basis.  Choose cotton fabrics whenever you can.  Keep an extra pair of underwear with you in a small bag and change if you become damp during the day at work/school.  Gold Bond Powder or Zeosorb Powder may be applied to the vulva and groin area one to two times per day to help absorb moisture. Do not use powders that contain cornstarch.   14. Using a lubricant may help dryness and irritation during intercourse. Use a small amount of a pure vegetable oil (solid or liquid) or olive oil. These oils contain no chemicals to irritate vulvar/vaginal skin. Also, these oils will rinse away with water and will not increase your chances of infection. Water-based lubricants tend to dry out before intercourse is over and may also contain chemicals that can irritate your vulvar skin. It may be helpful to use a non-lubricated, non-spermicidal condom, and use vegetable oil as the lubricant. This will help keep the semen off the skin, which can decrease burning and irritation after intercourse.    BIRTH CONTROL OPTIONS   1. The new low-dose oral birth control pills do not increase your chances of getting a yeast infection.   2. Lubricated condoms, contraceptive jellies, creams, or sponges may cause itching and burning. Ask your health care provider for help.   3. The use of latex condoms with a vegetable oil as a lubricant (#14 above) is suggested to protect your skin. Petroleum-based lubricants  may affect the integrity of condoms when used for birth control or prevention of sexually transmitted diseases. Our experience has not found this to be a problem with vegetable-based oils. However, the Centers for Disease Control recommends that condoms not be used with any oil based lubricants for birth control or prevention of sexually transmitted disease.

## 2024-10-03 NOTE — PROGRESS NOTES
[facility]  Obstetrics and Gynecology  Gyne Problem Visit      Lin Aldridge is a 43 year old female  presenting with concerns of recurrent yeast infections around her menses. She has tried monistat and AZO with little relief. States symptoms were relieved once her period started. Not currently experiencing any symptoms. She has been taking an OTC probiotic. She is sexually active with a new partner and would like STD screening. She does not use anything for contraception. She denies any abnormal vaginal discharge, odor or irritation. No dysuria or hematuria.    Patient's last menstrual period was 09/15/2024 (exact date).     Pap:   Contraception:None    OBSTETRICS HISTORY:  OB History    Para Term  AB Living   4 3 1 2 1 2   SAB IAB Ectopic Multiple Live Births   1 0 0 0 2       GYNE HISTORY:  Hx Prior Abnormal Pap: Yes  Pap Result Notes: Pt had colposcopy when young, normal per pt   Menarche: 14 (10/3/2024  9:06 AM)  Period Cycle (Days): 28 (10/3/2024  9:06 AM)  Period Duration (Days): 6 days (10/3/2024  9:06 AM)  Period Flow: heavy to moderate (10/3/2024  9:06 AM)  Use of Birth Control (if yes, specify type): Condoms (10/3/2024  9:06 AM)  Hx Prior Abnormal Pap: Yes (10/3/2024  9:06 AM)  Pap Result Notes: Pt had colposcopy when young, normal per pt (10/3/2024  9:06 AM)        Latest Ref Rng & Units 2023     2:33 PM   RECENT PAP RESULTS   INTERPRETATION/RESULT:  Cytology Results: Negative for intraepithelial lesion or malignancy.    HPV Negative Negative          History   Sexual Activity    Sexual activity: Not on file       MEDICAL HISTORY:  Past Medical History:   Diagnosis Date    Calculus of kidney     CLL (chronic lymphocytic leukemia) (HCC) 2023    Esophageal reflux     Morbid obesity with BMI of 45.0-49.9, adult (HCC)     Ovarian cyst     S/P gastric sleeve procedure 2023    weight loss    S/P laparoscopic sleeve gastrectomy 2023    Visual impairment     glasses      Past Surgical History:   Procedure Laterality Date    Cystoscopy,insert ureteral stent      Hc cervical cerclage      Lap sleeve gastrectomy  12/11/2023    Dr Hamilton, [facility]    Other surgical history Right     ACL / meniscus repair    Removal of kidney stone  2023       SOCIAL HISTORY:  Social History     Socioeconomic History    Marital status: Single     Spouse name: Not on file    Number of children: Not on file    Years of education: Not on file    Highest education level: Not on file   Occupational History    Not on file   Tobacco Use    Smoking status: Never    Smokeless tobacco: Never   Vaping Use    Vaping status: Never Used   Substance and Sexual Activity    Alcohol use: Not Currently     Comment: occasionally    Drug use: No    Sexual activity: Not on file   Other Topics Concern    Not on file   Social History Narrative    Not on file     Social Determinants of Health     Financial Resource Strain: Not on file   Food Insecurity: Not on file   Transportation Needs: Not on file   Physical Activity: Not on file   Stress: Not on file   Social Connections: Not on file   Housing Stability: Not on file       MEDICATIONS:    Current Outpatient Medications:     Multiple Vitamins-Minerals (BARIATRIC MULTIVITAMINS/IRON OR), Take by mouth., Disp: , Rfl:     ALLERGIES:    Allergies   Allergen Reactions    Penicillins RASH     As a baby         REVIEW OF SYSTEMS:  Review of Systems   Constitutional:  Negative for chills, fever and unexpected weight change.   Respiratory: Negative.     Cardiovascular: Negative.    Gastrointestinal:  Negative for abdominal pain, constipation, diarrhea and nausea.   Genitourinary:  Negative for dyspareunia, dysuria, genital sores, hematuria, menstrual problem, pelvic pain, vaginal bleeding, vaginal discharge and vaginal pain.   Musculoskeletal: Negative.    Skin: Negative.    Neurological: Negative.    Hematological: Negative.    Psychiatric/Behavioral: Negative.          PHYSICAL EXAM:  BP (!) 137/92   Ht 5' 4\" (1.626 m)   Wt 211 lb 12.8 oz (96.1 kg)   LMP 09/15/2024 (Exact Date)   BMI 36.36 kg/m²     GENERAL: well developed, well nourished, in no apparent distress  ABDOMEN: Soft, non distended; non tender, no masses  GYNE/: External Genitalia: Normal appearing, no lesions. Urethral meatus appear wnl, no abnormal discharge or lesions noted.          Bladder: well supported, urethra wnl, no lesions or fissures                     Vagina: normal pink mucosa, no lesions, normal clear discharge.                      Uterus:  mobile, non tender, normal size                     Cervix: Normal                      Adnexa: non tender, no masses, normal size     ASSESSMENT:       ICD-10-CM    1. Vaginitis and vulvovaginitis  N76.0 Vaginitis Vaginosis PCR Panel      2. Screening examination for STD (sexually transmitted disease)  Z11.3 HCV Antibody     Hepatitis B Surface Antigen     HIV AG AB Combo     T PALLIDUM SCREENING CASCADE     Chlamydia/GC PCR Combo          Plan:  - vaginal cultures collected, will treat pending results. Vaginal hygiene discussed.  - STD screening collected, safe sex practices encouraged.      ABAD JUNG PA-C  9:11 AM  10/3/2024        Spent total time 20 minutes on obtaining history / chart review, evaluating patient / performing medically appropriate exam, discussing treatment options, counseling / educating, and completing documentation, coordinating care.

## 2024-10-04 LAB
BV BACTERIA DNA VAG QL NAA+PROBE: NEGATIVE
C GLABRATA DNA VAG QL NAA+PROBE: NEGATIVE
C KRUSEI DNA VAG QL NAA+PROBE: NEGATIVE
C TRACH DNA SPEC QL NAA+PROBE: NEGATIVE
CANDIDA DNA VAG QL NAA+PROBE: NEGATIVE
N GONORRHOEA DNA SPEC QL NAA+PROBE: NEGATIVE
RPR SER QL: REACTIVE
T VAGINALIS DNA VAG QL NAA+PROBE: NEGATIVE

## 2024-10-14 ENCOUNTER — TELEPHONE (OUTPATIENT)
Dept: OBGYN CLINIC | Facility: CLINIC | Age: 44
End: 2024-10-14

## 2024-10-14 NOTE — TELEPHONE ENCOUNTER
Called patient, no response. Voice message left.    Upon reviewing last years results which were also positive for syphilis, it was recommended for patient to follow-up with ID and possibly allergist given her history of allergies to penicillin. Unsure if patient was able to follow-up.

## 2024-10-15 NOTE — TELEPHONE ENCOUNTER
Patient verified name and     Patient reports she did not see Infectious Disease. She did see allergist but was not given any further information. Patient inquiring why she needs to follow up with ID if she was previously treated. Patient informed due to persistent positive result and penicillin allergy there might be other forms of treatments they can provide. Aware we will confirm with Anamarta whether the next step would be a follow up with ID or additional recommendations. Voiced understanding and agreed.

## 2024-10-24 ENCOUNTER — TELEPHONE (OUTPATIENT)
Dept: SURGERY | Facility: CLINIC | Age: 44
End: 2024-10-24

## 2025-04-07 ENCOUNTER — LAB ENCOUNTER (OUTPATIENT)
Dept: LAB | Facility: HOSPITAL | Age: 45
End: 2025-04-07
Attending: INTERNAL MEDICINE
Payer: COMMERCIAL

## 2025-04-07 DIAGNOSIS — A53.9 SYPHILIS, UNSPECIFIED: Primary | ICD-10-CM

## 2025-04-07 LAB — T PALLIDUM AB SER QL IA: REACTIVE

## 2025-04-07 PROCEDURE — 36415 COLL VENOUS BLD VENIPUNCTURE: CPT

## 2025-04-07 PROCEDURE — 86780 TREPONEMA PALLIDUM: CPT

## 2025-04-07 PROCEDURE — 86592 SYPHILIS TEST NON-TREP QUAL: CPT

## 2025-04-09 LAB — RPR SER QL: NONREACTIVE

## 2025-04-14 LAB
TREPONEMAL ANTIBODIES, TPPA: REACTIVE
TREPONEMAL ANTIBODIES, TPPA: REACTIVE

## (undated) DEVICE — TROCAR: Brand: KII FIOS FIRST ENTRY

## (undated) DEVICE — DRAPE SHEET LG

## (undated) DEVICE — VIOLET BRAIDED (POLYGLACTIN 910), SYNTHETIC ABSORBABLE SUTURE: Brand: COATED VICRYL

## (undated) DEVICE — SUTURE PDS II SZ 1 L27IN ABSRB VLT CT-1 L36MM

## (undated) DEVICE — ENSEAL X1 TISSUE SEALER, CURVED JAW, 45 CM SHAFT LENGTH: Brand: ENSEAL

## (undated) DEVICE — ENDOSCOPIC VALVE WITH ADAPTER.: Brand: SURSEAL® II

## (undated) DEVICE — Device: Brand: SUTURE PASSOR PRO

## (undated) DEVICE — PROXIMATE SKIN STAPLERS (35 WIDE) CONTAINS 35 STAINLESS STEEL STAPLES (FIXED HEAD): Brand: PROXIMATE

## (undated) DEVICE — UROLOGY DRAIN BAG

## (undated) DEVICE — SLEEVE SUR 1 SZ L6X23.5IN 4 SMS LO LINT EVOL

## (undated) DEVICE — SOLUTION  .9 3000ML

## (undated) DEVICE — APPLICATOR COTTON TIP 3 10/PK

## (undated) DEVICE — ENCORE® LATEX ACCLAIM SIZE 6, STERILE LATEX POWDER-FREE SURGICAL GLOVE: Brand: ENCORE

## (undated) DEVICE — Device: Brand: JELCO

## (undated) DEVICE — [HIGH FLOW INSUFFLATOR,  DO NOT USE IF PACKAGE IS DAMAGED,  KEEP DRY,  KEEP AWAY FROM SUNLIGHT,  PROTECT FROM HEAT AND RADIOACTIVE SOURCES.]: Brand: PNEUMOSURE

## (undated) DEVICE — VIAL ISOVUE 300 10X100ML

## (undated) DEVICE — SOLO FLEX HYBRID GUIDEWIRE .03

## (undated) DEVICE — UNDYED BRAIDED (POLYGLACTIN 910), SYNTHETIC ABSORBABLE SUTURE: Brand: COATED VICRYL

## (undated) DEVICE — NITINOL STONE RETRIEVAL BASKET: Brand: ZERO TIP

## (undated) DEVICE — LAP CHOLE: Brand: MEDLINE INDUSTRIES, INC.

## (undated) DEVICE — MAT AIR TRNSF W34XL78IN NYL POLYPR FBR

## (undated) DEVICE — SOLUTION IRRIG 1000ML 0.9% NACL USP BTL

## (undated) DEVICE — PENCIL ES BTTN SWCH W/ TIP HOLSTER E-Z CLN

## (undated) DEVICE — GAMMEX® PI HYBRID SIZE 7, STERILE POWDER-FREE SURGICAL GLOVE, POLYISOPRENE AND NEOPRENE BLEND: Brand: GAMMEX

## (undated) DEVICE — 3M™ STERI-DRAPE™ INSTRUMENT POUCH 1018L: Brand: STERI-DRAPE™

## (undated) DEVICE — HYDROGEL COATED URETERAL DILATOR: Brand: NOTTINGHAM ONE-STEP

## (undated) DEVICE — PAD,EYE,LARGE,2 1/8"X2 5/8",STERILE,LF: Brand: MEDLINE

## (undated) DEVICE — TROCAR: Brand: KII® SLEEVE

## (undated) DEVICE — TOWEL SURG OR 17X30IN BLUE

## (undated) DEVICE — ENDOPATH ECHELON ENDOSCOPIC LINEAR CUTTER RELOADS, GREEN, 60MM: Brand: ECHELON ENDOPATH

## (undated) DEVICE — ADHESIVE SKIN TOP FOR WND CLSR DERMBND ADV

## (undated) DEVICE — SLEEVE KENDALL SCD EXPRESS MED

## (undated) DEVICE — ECHELON FLEX POWERED PLUS LONG ARTICULATING ENDOSCOPIC LINEAR CUTTER, 60MM: Brand: ECHELON FLEX

## (undated) DEVICE — ENDOPATH ECHELON ENDOSCOPIC LINEAR CUTTER RELOADS, BLACK, 60MM: Brand: ECHELON ENDOPATH

## (undated) DEVICE — OPEN-END URETERAL CATHETER: Brand: COOK

## (undated) DEVICE — OPEN-END URETERAL CATHETER SOF-FLEX: Brand: SOF-FLEX

## (undated) DEVICE — STERILE WATER 1000ML BTL

## (undated) DEVICE — VISIGI 3D®  CALIBRATION SYSTEM  SIZE 40FR STD W/ BULB: Brand: BOEHRINGER® VISIGI 3D™ SLEEVE GASTRECTOMY CALIBRATION SYSTEM, SIZE 40FR W/BULB

## (undated) DEVICE — CYSTO PACK: Brand: MEDLINE INDUSTRIES, INC.

## (undated) NOTE — ED AVS SNAPSHOT
Andrade Ward   MRN: [id number]    Department:  [facility] Emergency Department   Date of Visit:  2/20/2019           Disclosure     Insurance plans vary and the physician(s) referred by the ER may not be covered by your plan.  Please contact CARE PHYSICIAN AT ONCE OR RETURN IMMEDIATELY TO THE EMERGENCY DEPARTMENT. If you have been prescribed any medication(s), please fill your prescription right away and begin taking the medication(s) as directed.   If you believe that any of the medications

## (undated) NOTE — LETTER
05/12/21        Patient: Elisha Pizarro   YOB: 1980   Date of Visit: 5/12/2021       Dear  Dr. Brittni Lieberman,      Thank you for referring Elisha Pizarro to my practice.   She complained of persistent sore throat for about a month

## (undated) NOTE — LETTER
Date & Time: 8/8/2023, 8:26 PM  Patient: Kulwant Hollingsworth  Encounter Provider(s):    Tali Hackett MD       To Whom It May Concern:    Cheryl Mcnally was seen and treated in our department on 8/8/2023. She may return to work Friday 8/11/2023.     If you have any questions or concerns, please do not hesitate to call.        _____________________________  Physician/APC Signature

## (undated) NOTE — LETTER
Date & Time: 8/8/2023, 8:33 PM  Patient: Olvin Whittington  Encounter Provider(s):    Satya Anderson MD       To Whom It May Concern:    Rachael Winkler was seen and treated in our department on 8/8/2023. She should not return to work until 8/13/23 .     If you have any questions or concerns, please do not hesitate to call.        _____________________________  Physician/APC Signature

## (undated) NOTE — MR AVS SNAPSHOT
[facility] [address]  [phone]               Thank you for choosing us for your health care visit with Eliane Low MD.  We are glad to serve you and happy to provide you with this summary of your visit. Please help us to ensure we have accurate records. If you find anything that needs to be changed, please let our staff know as soon as possible. After Visit Summary        Provider Location    11/8/2023 Eliane Low MD [facility]      Your Appointments    Dec 05, 2023 10:30 AM  RD Liquid Protein with Jean Carlos Hutchins RD  [facility], [address] [facility], [facility] [address] [place] [address] [address] [phone]      Feb 16, 2024  9:30 AM  PreSurgical Follow Up with MD Mona Minor, [address] [facility], [facility]) [address] [place] [address] [address] [phone]   Please arrive 15 minutes prior to your scheduled appointment. Be sure to bring your current Insurance card, photo ID and a list of your current medications.      Please verify with your Primary Care Provider if your insurance requires a referral.     A 24 hour notice is required to cancel any appointment or you may be charged a $40 No Show Fee         Reason for Today's Visit     Follow - Up    Pre-Op Exam      Medical Issues Discussed Today    CLL (chronic lymphocytic leukemia)  Morbid obesity with BMI of 45.0-49.9, adult     Instructions and Information about Your Health    None     Allergies as of Nov 08, 2023     Penicillins RASH    As a baby                Today's Vital Signs  Most recent update: 11/8/2023  9:25 AM    BP   128/80    Pulse   99    Height   5' 4.3\" (1.633 m)    Weight   271 lb (122.9 kg)             Current Medications          Accurate as of November 8, 2023 11:53 AM. Always use your most recent med list.            aprepitant 40 MG Caps  Take 1 capsule (40 mg total) by mouth one time for 1 dose. 3-4 hours before surgery  Commonly known as: Emend        Clindamycin Phosphate 1 % Gel  Apply 1 Application topically every morning. doxycycline 100 MG Caps  Take 1 capsule (100 mg total) by mouth 2 (two) times daily. Commonly known as: Vibramycin        * Fexofenadine-Pseudoephed -240 MG Tb24  Take 1 tablet by mouth daily. Commonly known as: ALLEGRA-D 24        * Fexofenadine-Pseudoephed -240 MG Tb24  Take 1 tablet by mouth daily. Commonly known as: ALLEGRA-D 24        * Hydroquinone 4 % Crea  APPLY ONCE TO TWICE DAILY TO HYPERPIGMENTED SKIN ONLY        * Hydroquinone 4 % Crea  APPLY ONCE TO TWICE DAILY TO HYPERPIGMENTED SKIN ONLY        ibuprofen 200 MG Tabs  Take 200 mg by mouth every 6 (six) hours as needed for Pain. Commonly known as: Motrin        Phentermine HCl 37.5 MG Tabs  Take 1 tablet (37.5 mg total) by mouth every morning before breakfast.  Commonly known as: ADIPEX-P        * tretinoin 0.025 % Crea  APPLY A PEA SIZED AMOUNT TO THE AFFECTED AREA OF FACE AT BEDTIME  Commonly known as: Retin-A        * Tretinoin 0.05 % Crea            * This list has 6 medication(s) that are the same as other medications prescribed for you. Read the directions carefully, and ask your doctor or other care provider to review them with you. Where to Get Your Medications      These medications were sent to [address], [phone], [phone]  [address] [place], [place] [address]M[address]o [address] [phone]    Phone: [phone]   aprepitant 36 MG Caps           MyChart    Visit MyChart  You can access your MyChart to more actively manage your health care and view more details from this visit by going to [email or web address]. If you've recently had a stay at the Hospital you can access your discharge instructions in [employer] by going to Visits < Admission Summaries.  If you've been to the Emergency Department or your doctor's office, you can view your past visit information in ice by going to Visits < Visit Summaries. ice questions? Call [phone] for help. ice is NOT to be used for urgent needs. For medical emergencies, dial 911.           Visit [email or web address] online at  [email or web address]